# Patient Record
Sex: FEMALE | Race: WHITE | NOT HISPANIC OR LATINO | Employment: UNEMPLOYED | URBAN - METROPOLITAN AREA
[De-identification: names, ages, dates, MRNs, and addresses within clinical notes are randomized per-mention and may not be internally consistent; named-entity substitution may affect disease eponyms.]

---

## 2023-09-07 LAB
ABO GROUP (TYPE) IN BLOOD: NORMAL
ANTIBODY SCREEN: NORMAL
ERYTHROCYTE DISTRIBUTION WIDTH (RATIO) BY AUTOMATED COUNT: 12.7 % (ref 11.5–14.5)
ERYTHROCYTE MEAN CORPUSCULAR HEMOGLOBIN CONCENTRATION (G/DL) BY AUTOMATED: 33.3 G/DL (ref 32–36)
ERYTHROCYTE MEAN CORPUSCULAR VOLUME (FL) BY AUTOMATED COUNT: 91 FL (ref 80–100)
ERYTHROCYTES (10*6/UL) IN BLOOD BY AUTOMATED COUNT: 4.02 X10E12/L (ref 4–5.2)
HEMATOCRIT (%) IN BLOOD BY AUTOMATED COUNT: 36.6 % (ref 36–46)
HEMOGLOBIN (G/DL) IN BLOOD: 12.2 G/DL (ref 12–16)
HEPATITIS B VIRUS SURFACE AG PRESENCE IN SERUM: NONREACTIVE
HEPATITIS C VIRUS AB PRESENCE IN SERUM: NONREACTIVE
HIV 1/ 2 AG/AB SCREEN: NONREACTIVE
LEUKOCYTES (10*3/UL) IN BLOOD BY AUTOMATED COUNT: 8.1 X10E9/L (ref 4.4–11.3)
NRBC (PER 100 WBCS) BY AUTOMATED COUNT: 0 /100 WBC (ref 0–0)
PLATELETS (10*3/UL) IN BLOOD AUTOMATED COUNT: 231 X10E9/L (ref 150–450)
REFLEX ADDED, ANEMIA PANEL: NORMAL
RH FACTOR: NORMAL

## 2023-09-08 LAB
RUBELLA VIRUS IGG AB: POSITIVE
SYPHILIS TOTAL AB: NONREACTIVE

## 2023-09-29 LAB
CHLAMYDIA TRACH., AMPLIFIED: NEGATIVE
N. GONORRHEA, AMPLIFIED: NEGATIVE

## 2023-09-30 PROBLEM — R10.32 ABDOMINAL PAIN, LLQ (LEFT LOWER QUADRANT): Status: ACTIVE | Noted: 2023-09-30

## 2023-09-30 PROBLEM — R21 RASH/SKIN ERUPTION: Status: ACTIVE | Noted: 2023-09-30

## 2023-09-30 PROBLEM — K42.9 UMBILICAL HERNIA: Status: ACTIVE | Noted: 2023-09-30

## 2023-09-30 PROBLEM — O35.9XX0 SUSPECTED FETAL ANOMALY, ANTEPARTUM (HHS-HCC): Status: ACTIVE | Noted: 2023-09-30

## 2023-09-30 PROBLEM — Z04.9 CONDITION NOT FOUND: Status: ACTIVE | Noted: 2023-09-30

## 2023-09-30 PROBLEM — R10.2 FEMALE PELVIC PAIN: Status: ACTIVE | Noted: 2023-09-30

## 2023-09-30 PROBLEM — R10.33 UMBILICAL PAIN: Status: ACTIVE | Noted: 2023-09-30

## 2023-09-30 LAB — URINE CULTURE: NORMAL

## 2023-09-30 RX ORDER — ACETAMINOPHEN 325 MG/1
3 TABLET ORAL EVERY 6 HOURS
COMMUNITY
Start: 2020-12-19 | End: 2023-10-25 | Stop reason: ALTCHOICE

## 2023-09-30 RX ORDER — PRENATAL VIT 49/IRON FUM/FOLIC 6.75-0.2MG
TABLET ORAL
COMMUNITY
End: 2024-02-12 | Stop reason: HOSPADM

## 2023-09-30 RX ORDER — VITAMIN B COMPLEX
CAPSULE ORAL
COMMUNITY
End: 2024-02-12 | Stop reason: HOSPADM

## 2023-09-30 RX ORDER — IBUPROFEN 600 MG/1
600 TABLET ORAL EVERY 6 HOURS
COMMUNITY
Start: 2020-12-19 | End: 2023-10-25 | Stop reason: ALTCHOICE

## 2023-09-30 RX ORDER — FERROUS SULFATE 325(65) MG
TABLET ORAL
COMMUNITY
End: 2023-10-25 | Stop reason: ALTCHOICE

## 2023-10-03 ENCOUNTER — OFFICE VISIT (OUTPATIENT)
Dept: ORTHOPEDIC SURGERY | Facility: CLINIC | Age: 33
End: 2023-10-03
Payer: COMMERCIAL

## 2023-10-03 DIAGNOSIS — M25.562 CHRONIC PAIN OF LEFT KNEE: Primary | ICD-10-CM

## 2023-10-03 DIAGNOSIS — G89.29 CHRONIC PAIN OF LEFT KNEE: Primary | ICD-10-CM

## 2023-10-03 PROCEDURE — 99204 OFFICE O/P NEW MOD 45 MIN: CPT | Performed by: ORTHOPAEDIC SURGERY

## 2023-10-03 PROCEDURE — 1036F TOBACCO NON-USER: CPT | Performed by: ORTHOPAEDIC SURGERY

## 2023-10-03 RX ORDER — DICLOFENAC SODIUM 10 MG/G
4 GEL TOPICAL 4 TIMES DAILY
Qty: 1 G | Refills: 1 | Status: CANCELLED | OUTPATIENT
Start: 2023-10-03

## 2023-10-03 RX ORDER — DICLOFENAC SODIUM 10 MG/G
4 GEL TOPICAL 4 TIMES DAILY
Qty: 1 G | Refills: 1 | Status: SHIPPED | OUTPATIENT
Start: 2023-10-03 | End: 2023-10-25 | Stop reason: ALTCHOICE

## 2023-10-03 NOTE — PROGRESS NOTES
This is a new consultation for left knee pain from Candace Green DO      This is a 33 y.o. female who presents with left knee pain.  Patient states has been going on for 1 year.  Is a chronic issue but is been exacerbated for the last several weeks.  Sharp type pain over the medial aspect of the knee worse with walking.  Is associated with popping and catching.  She has never had injections or surgery on that knee.  No injury or change in activity recently but she did have an injury around the time that it started.  No numbness or tingling no fevers or chills no shooting pain, leg.  She takes Tylenol.  She is 16 weeks pregnant.    Physical Exam    Patients' self reported past medical history, medications, allergies, surgical history, family and social history as well as a 10 point  review of systems has been documented in the new patient intake form and scanned into the patient's electronic medical record.  The intake form was reviewed by Dr Davis during the initial office visit and signed by Dr. Davis and the patient. Pertinent findings are  documented in the HPI.    A complete 10 point review of systems was performed and was negative except as noted above in HPI.     Constitutional: well developed, well nourished female in no acute distress  Psychiatric: normal mood, appropriate affect  Eyes: sclera anicteric  HENT: normocephalic/atraumatic  CV: regular rate and rhythm   Respiratory: non labored breathing  Integumentary: no rash  Neurological: moves all extremities    Left knee exam: skin intact no lacerations or abrations.  1+ effusion.  Tender medial joint line. negative log roll negative patellar grind. ROM 0-120. stable to varus and valgus stress at 0 and 30 degrees. negative lachman negative posterior drawer negative gt. 5/5 ehl/fhl/gs/ta. silt s/s/sp/dp/t. 2+ dp/pt        No x-rays done today because of the patient's pregnancy    Impression/Plan: This is a 33 y.o. female with left knee  pain and mechanical symptoms consistent with a possible meniscal tear.  I discussed the situation with her in detail including the clinical radiographic findings treatment options were sent benefits.  We discussed nonsurgical treatment for now given the patient's current pregnancy.  We discussed the use of anti-inflammatories in the setting of pregnancy and recommend Tylenol and a topical anti-inflammatory.  Recommend she discuss with her OB/GYN regarding other potential medications during this time.  We will start her on physical therapy.  Could also consider injection patient deferred for today.  We will plan to defer advanced imaging or consideration of surgery until after the pregnancy.  We will see her back as needed

## 2023-10-03 NOTE — LETTER
October 3, 2023     Candace Green DO  [address]  [facility]  [address]  [facility] [address]    Patient: Christine Navarro   YOB: 1990   Date of Visit: 10/3/2023       Dear Dr. Candace Green DO:    Thank you for referring Christine Navarro to me for evaluation. Below are my notes for this consultation.  If you have questions, please do not hesitate to call me. I look forward to following your patient along with you.       Sincerely,     Rosalino Davis MD      CC: No Recipients  ______________________________________________________________________________________    This is a new consultation for left knee pain from Candace Green DO      This is a 33 y.o. female who presents with left knee pain.  Patient states has been going on for 1 year.  Is a chronic issue but is been exacerbated for the last several weeks.  Sharp type pain over the medial aspect of the knee worse with walking.  Is associated with popping and catching.  She has never had injections or surgery on that knee.  No injury or change in activity recently but she did have an injury around the time that it started.  No numbness or tingling no fevers or chills no shooting pain, leg.  She takes Tylenol.  She is 16 weeks pregnant.    Physical Exam    Patients' self reported past medical history, medications, allergies, surgical history, family and social history as well as a 10 point  review of systems has been documented in the new patient intake form and scanned into the patient's electronic medical record.  The intake form was reviewed by Dr Davis during the initial office visit and signed by Dr. Davis and the patient. Pertinent findings are  documented in the HPI.    A complete 10 point review of systems was performed and was negative except as noted above in HPI.     Constitutional: well developed, well nourished female in no acute distress  Psychiatric:  normal mood, appropriate affect  Eyes: sclera anicteric  HENT: normocephalic/atraumatic  CV: regular rate and rhythm   Respiratory: non labored breathing  Integumentary: no rash  Neurological: moves all extremities    Left knee exam: skin intact no lacerations or abrations.  1+ effusion.  Tender medial joint line. negative log roll negative patellar grind. ROM 0-120. stable to varus and valgus stress at 0 and 30 degrees. negative lachman negative posterior drawer negative gt. 5/5 ehl/fhl/gs/ta. silt s/s/sp/dp/t. 2+ dp/pt        No x-rays done today because of the patient's pregnancy    Impression/Plan: This is a 33 y.o. female with left knee pain and mechanical symptoms consistent with a possible meniscal tear.  I discussed the situation with her in detail including the clinical radiographic findings treatment options were sent benefits.  We discussed nonsurgical treatment for now given the patient's current pregnancy.  We discussed the use of anti-inflammatories in the setting of pregnancy and recommend Tylenol and a topical anti-inflammatory.  Recommend she discuss with her OB/GYN regarding other potential medications during this time.  We will start her on physical therapy.  Could also consider injection patient deferred for today.  We will plan to defer advanced imaging or consideration of surgery until after the pregnancy.  We will see her back as needed

## 2023-10-04 VITALS — SYSTOLIC BLOOD PRESSURE: 130 MMHG | BODY MASS INDEX: 31.96 KG/M2 | DIASTOLIC BLOOD PRESSURE: 80 MMHG | WEIGHT: 198 LBS

## 2023-10-04 NOTE — PROGRESS NOTES
ENOB.  Feeling well today. Less nausea, more energy.  No bleeding or abdominal pain    Limited abdominal ultrasound done to find FHR: normal heart rate, active fetal movements.    Routine prenatal care.   Prenatal labs done and normal.  Urine, gc/ct testing (also from urine - patient declined pelvic exam)  Declines genetic screening.  Declines Flu and Covid vaccinations.  Order given for her anatomy ultrasound. -Renee Hill 09/28/2023 02:04 PM

## 2023-10-19 ENCOUNTER — APPOINTMENT (OUTPATIENT)
Dept: RADIOLOGY | Facility: CLINIC | Age: 33
End: 2023-10-19
Payer: COMMERCIAL

## 2023-10-23 ENCOUNTER — ANCILLARY PROCEDURE (OUTPATIENT)
Dept: RADIOLOGY | Facility: CLINIC | Age: 33
End: 2023-10-23
Payer: COMMERCIAL

## 2023-10-23 DIAGNOSIS — O09.529 AMA (ADVANCED MATERNAL AGE) MULTIGRAVIDA 35+ (HHS-HCC): ICD-10-CM

## 2023-10-23 DIAGNOSIS — Z3A.20 20 WEEKS GESTATION OF PREGNANCY (HHS-HCC): ICD-10-CM

## 2023-10-23 PROCEDURE — 76811 OB US DETAILED SNGL FETUS: CPT | Performed by: OBSTETRICS & GYNECOLOGY

## 2023-10-23 PROCEDURE — 76811 OB US DETAILED SNGL FETUS: CPT

## 2023-10-24 RX ORDER — FAMOTIDINE 40 MG/1
40 TABLET, FILM COATED ORAL DAILY
COMMUNITY
Start: 2023-04-24 | End: 2023-10-25 | Stop reason: ALTCHOICE

## 2023-10-24 RX ORDER — EPINEPHRINE 0.3 MG/.3ML
0.3 INJECTION SUBCUTANEOUS ONCE AS NEEDED
COMMUNITY
Start: 2023-04-24 | End: 2023-12-15 | Stop reason: WASHOUT

## 2023-10-24 NOTE — PATIENT INSTRUCTIONS
You were seen in the office today for routine OB care and had normal findings on exam  Continue routine OB precautions at home  Continue taking prenatal vitamins   Avoid sick contacts and consider getting your Flu (available in office during flu season) and COVID vaccines to protect against infection in pregnancy  You will be given an order for your anatomy ultrasound. Please schedule at [facility] OB imaging between 19 and 22 weeks gestation [phone]  You will be given a bottle of Glucola and orders for your second trimester labs. Please complete these between 25 and 28 weeks gestation. You may complete these at any  outpatient lab, and do not need an appointment  Make an appointment for routine care in the office in the next 4 weeks  If you are having any concerns prior to your next visit please call the office to speak to the physician on call. This includes after hours, weekends, and holidays, when the answering service will be able to connect you with the physician on call. [phone] ([place] [facility]) or [phone] [facility].

## 2023-10-25 ENCOUNTER — ROUTINE PRENATAL (OUTPATIENT)
Dept: OBSTETRICS AND GYNECOLOGY | Facility: CLINIC | Age: 33
End: 2023-10-25
Payer: COMMERCIAL

## 2023-10-25 VITALS — BODY MASS INDEX: 32.6 KG/M2 | SYSTOLIC BLOOD PRESSURE: 114 MMHG | DIASTOLIC BLOOD PRESSURE: 72 MMHG | WEIGHT: 202 LBS

## 2023-10-25 DIAGNOSIS — Z3A.19 19 WEEKS GESTATION OF PREGNANCY (HHS-HCC): ICD-10-CM

## 2023-10-25 DIAGNOSIS — Z34.82 PRENATAL CARE, SUBSEQUENT PREGNANCY, SECOND TRIMESTER (HHS-HCC): Primary | ICD-10-CM

## 2023-10-25 DIAGNOSIS — Z98.891 HISTORY OF CESAREAN DELIVERY: ICD-10-CM

## 2023-10-25 DIAGNOSIS — Z87.59 HISTORY OF PRE-ECLAMPSIA: ICD-10-CM

## 2023-10-25 LAB
POC GLUCOSE, URINE: NEGATIVE MG/DL
POC PROTEIN, URINE: NEGATIVE MG/DL

## 2023-10-25 PROCEDURE — 99213 OFFICE O/P EST LOW 20 MIN: CPT | Performed by: OBSTETRICS & GYNECOLOGY

## 2023-10-25 RX ORDER — CALCIUM CARBONATE 500(1250)
1 TABLET ORAL
COMMUNITY
End: 2024-02-12 | Stop reason: HOSPADM

## 2023-10-25 NOTE — PROGRESS NOTES
Subjective   Patient ID [id number]   Christine Navarro is a 33 y.o.  at 19w3d with a working estimated date of delivery of 3/17/2024, by Last Menstrual Period who presents for a routine prenatal visit. She denies vaginal bleeding, leakage of fluid, decreased fetal movements, or contractions.    Her pregnancy is complicated by:  AMA  Hx preeclampsia/GHTN  LTCS    Objective   Physical Exam  Weight: 91.6 kg (202 lb)  Expected Total Weight Gain: 5 kg (11 lb)-9 kg (19 lb)   Pregravid BMI: 31.65  BP: 114/72         Prenatal Labs  Urine dip:  Lab Results   Component Value Date    KETONESU NEGATIVE 2019       Lab Results   Component Value Date    HGB 12.2 2023    HCT 36.6 2023    ABO A 2023    HEPBSAG NONREACTIVE 2023           Imaging: anatomy US normal earlier this week, anterior placenta, no previa, 3VC          Assessment/Plan   Problem List Items Addressed This Visit    None  Visit Diagnoses         Codes    Prenatal care, subsequent pregnancy, second trimester    -  Primary Z34.82    Patient doing well  Anatomy US normal earlier this week  RTO 4 weeks     History of pre-eclampsia     Z87.59    BP normal range, patient asymptomatic  Continue  mg/day     History of  delivery     Z98.891    Patient desires TOLAC     19 weeks gestation of pregnancy     Z3A.19    Relevant Orders    POC Urine Dip (Completed)            Continue prenatal vitamin.  Labs reviewed.  Rhogam not indicated  GTT 25-28.    Follow up in 4 weeks for a routine prenatal visit.  Viridiana Morrison DO

## 2023-11-17 NOTE — PROGRESS NOTES
Subjective   Patient ID [id number]   Christine Navarro is a 33 y.o.  at 23.1 weeks, +FM. Patient hasn't started the ASA yet. Concerned about risks.    Her pregnancy is complicated by:  AMA  Hx preeclampsia/GHTN  LTCS    Objective   Physical Exam  Weight: 93.4 kg (206 lb)  BP: 122/70  Fetal Heart Rate: 140 Fundal Height (cm): 27 cm    Lab Results   Component Value Date    HGB 12.2 2023    HCT 36.6 2023       Assessment/Plan   Recommend aspirin  mg. Discussed that it is very safe in pregnancy and has been used for years. She is at high risk for Preeclampsia and it is recommended. She is late now but would still begin it.  Growth scan at 30 weeks.  Glucola given.  Follow up in 4 weeks for a routine prenatal visit.

## 2023-11-20 ENCOUNTER — ROUTINE PRENATAL (OUTPATIENT)
Dept: OBSTETRICS AND GYNECOLOGY | Facility: CLINIC | Age: 33
End: 2023-11-20
Payer: COMMERCIAL

## 2023-11-20 VITALS — SYSTOLIC BLOOD PRESSURE: 122 MMHG | WEIGHT: 206 LBS | DIASTOLIC BLOOD PRESSURE: 70 MMHG | BODY MASS INDEX: 33.25 KG/M2

## 2023-11-20 DIAGNOSIS — Z3A.23 23 WEEKS GESTATION OF PREGNANCY (HHS-HCC): ICD-10-CM

## 2023-11-20 LAB
POC BLOOD, URINE: NEGATIVE
POC GLUCOSE, URINE: NEGATIVE MG/DL
POC KETONES, URINE: NEGATIVE MG/DL
POC LEUKOCYTES, URINE: NEGATIVE
POC NITRITE,URINE: NEGATIVE
POC PROTEIN, URINE: NEGATIVE MG/DL

## 2023-11-20 PROCEDURE — 0501F PRENATAL FLOW SHEET: CPT | Performed by: OBSTETRICS & GYNECOLOGY

## 2023-12-13 NOTE — PROGRESS NOTES
ytSubjective   Patient ID [id number]   Christine Navarro is a 33 y.o.  at 26w5d 2,+FM, no ctxs/LOF/VB.    Her pregnancy is complicated by:  AMA  Hx preeclampsia/GHTN  LTCS    Objective   Physical Exam  Weight: 97.5 kg (215 lb)  BP: 118/72  Fetal Heart Rate: 140 Fundal Height (cm): 27 cm    Lab Results   Component Value Date    HGB 12.2 2023    HCT 36.6 2023       Assessment/Plan   Has a growth scan in January.  Did mary today.  Follow up in 4 weeks for a routine prenatal visit.

## 2023-12-15 ENCOUNTER — LAB (OUTPATIENT)
Dept: LAB | Facility: LAB | Age: 33
End: 2023-12-15
Payer: COMMERCIAL

## 2023-12-15 ENCOUNTER — ROUTINE PRENATAL (OUTPATIENT)
Dept: OBSTETRICS AND GYNECOLOGY | Facility: CLINIC | Age: 33
End: 2023-12-15
Payer: COMMERCIAL

## 2023-12-15 VITALS — DIASTOLIC BLOOD PRESSURE: 72 MMHG | WEIGHT: 215 LBS | SYSTOLIC BLOOD PRESSURE: 118 MMHG | BODY MASS INDEX: 34.7 KG/M2

## 2023-12-15 DIAGNOSIS — Z00.00 HEALTHCARE MAINTENANCE: Primary | ICD-10-CM

## 2023-12-15 DIAGNOSIS — Z3A.23 23 WEEKS GESTATION OF PREGNANCY (HHS-HCC): ICD-10-CM

## 2023-12-15 DIAGNOSIS — Z3A.26 26 WEEKS GESTATION OF PREGNANCY (HHS-HCC): ICD-10-CM

## 2023-12-15 DIAGNOSIS — Z32.02 PREGNANCY TEST NEGATIVE: ICD-10-CM

## 2023-12-15 DIAGNOSIS — Z00.00 HEALTHCARE MAINTENANCE: ICD-10-CM

## 2023-12-15 LAB
25(OH)D3 SERPL-MCNC: 16 NG/ML (ref 30–100)
ERYTHROCYTE [DISTWIDTH] IN BLOOD BY AUTOMATED COUNT: 12.6 % (ref 11.5–14.5)
GLUCOSE 1H P 50 G GLC PO SERPL-MCNC: 83 MG/DL
HCT VFR BLD AUTO: 33.7 % (ref 36–46)
HGB BLD-MCNC: 11.4 G/DL (ref 12–16)
MCH RBC QN AUTO: 31.2 PG (ref 26–34)
MCHC RBC AUTO-ENTMCNC: 33.8 G/DL (ref 32–36)
MCV RBC AUTO: 92 FL (ref 80–100)
NRBC BLD-RTO: 0 /100 WBCS (ref 0–0)
PLATELET # BLD AUTO: 213 X10*3/UL (ref 150–450)
POC BLOOD, URINE: NEGATIVE
POC GLUCOSE, URINE: NEGATIVE MG/DL
POC KETONES, URINE: NEGATIVE MG/DL
POC LEUKOCYTES, URINE: NEGATIVE
POC NITRITE,URINE: NEGATIVE
POC PROTEIN, URINE: NEGATIVE MG/DL
PREGNANCY TEST URINE, POC: NEGATIVE
RBC # BLD AUTO: 3.65 X10*6/UL (ref 4–5.2)
WBC # BLD AUTO: 8.1 X10*3/UL (ref 4.4–11.3)

## 2023-12-15 PROCEDURE — 81025 URINE PREGNANCY TEST: CPT | Performed by: OBSTETRICS & GYNECOLOGY

## 2023-12-15 PROCEDURE — 36415 COLL VENOUS BLD VENIPUNCTURE: CPT

## 2023-12-15 PROCEDURE — 99213 OFFICE O/P EST LOW 20 MIN: CPT | Performed by: OBSTETRICS & GYNECOLOGY

## 2023-12-15 PROCEDURE — 82947 ASSAY GLUCOSE BLOOD QUANT: CPT

## 2023-12-15 PROCEDURE — 85027 COMPLETE CBC AUTOMATED: CPT

## 2023-12-15 PROCEDURE — 82306 VITAMIN D 25 HYDROXY: CPT

## 2023-12-15 PROCEDURE — 81003 URINALYSIS AUTO W/O SCOPE: CPT | Performed by: OBSTETRICS & GYNECOLOGY

## 2023-12-15 NOTE — PATIENT INSTRUCTIONS
You were seen in the office today for routine OB care and had noirmal findings on exam  Continue routine OB precautions at home  Continue taking prenatal vitamins   Avoid sick contacts and consider getting your Flu (available in office during flu season) and COVID vaccines to protect against infection in pregnancy  You will be given an order for your anatomy ultrasound. Please schedule at [facility] OB imaging between 19 and 22 weeks gestation [phone]  You will be given a bottle of Glucola and orders for your second trimester labs. Please complete these between 25 and 28 weeks gestation. You may complete these at any  outpatient lab, and do not need an appointment  Make an appointment for routine care in the office in the next 4 weeks  If you are having any concerns prior to your next visit please call the office to speak to the physician on call. This includes after hours, weekends, and holidays, when the answering service will be able to connect you with the physician on call. [phone] ([place] [facility]) or [phone] [place] [facility].

## 2023-12-18 ENCOUNTER — APPOINTMENT (OUTPATIENT)
Dept: OBSTETRICS AND GYNECOLOGY | Facility: CLINIC | Age: 33
End: 2023-12-18
Payer: COMMERCIAL

## 2023-12-18 LAB — REFLEX ADDED, ANEMIA PANEL: NORMAL

## 2023-12-22 ENCOUNTER — APPOINTMENT (OUTPATIENT)
Dept: OBSTETRICS AND GYNECOLOGY | Facility: CLINIC | Age: 33
End: 2023-12-22
Payer: COMMERCIAL

## 2024-01-09 ENCOUNTER — APPOINTMENT (OUTPATIENT)
Dept: RADIOLOGY | Facility: CLINIC | Age: 34
End: 2024-01-09
Payer: COMMERCIAL

## 2024-01-11 ENCOUNTER — ANCILLARY PROCEDURE (OUTPATIENT)
Dept: RADIOLOGY | Facility: CLINIC | Age: 34
End: 2024-01-11
Payer: COMMERCIAL

## 2024-01-11 DIAGNOSIS — Z3A.30 30 WEEKS GESTATION OF PREGNANCY (HHS-HCC): ICD-10-CM

## 2024-01-11 PROCEDURE — 76816 OB US FOLLOW-UP PER FETUS: CPT | Performed by: OBSTETRICS & GYNECOLOGY

## 2024-01-11 PROCEDURE — 76816 OB US FOLLOW-UP PER FETUS: CPT

## 2024-01-11 PROCEDURE — 76819 FETAL BIOPHYS PROFIL W/O NST: CPT

## 2024-01-11 PROCEDURE — 76819 FETAL BIOPHYS PROFIL W/O NST: CPT | Performed by: OBSTETRICS & GYNECOLOGY

## 2024-01-15 ENCOUNTER — ROUTINE PRENATAL (OUTPATIENT)
Dept: OBSTETRICS AND GYNECOLOGY | Facility: CLINIC | Age: 34
End: 2024-01-15
Payer: COMMERCIAL

## 2024-01-15 ENCOUNTER — LAB (OUTPATIENT)
Dept: LAB | Facility: LAB | Age: 34
End: 2024-01-15
Payer: COMMERCIAL

## 2024-01-15 VITALS — BODY MASS INDEX: 35.02 KG/M2 | SYSTOLIC BLOOD PRESSURE: 142 MMHG | WEIGHT: 217 LBS | DIASTOLIC BLOOD PRESSURE: 88 MMHG

## 2024-01-15 DIAGNOSIS — Z34.83 MULTIGRAVIDA IN THIRD TRIMESTER (HHS-HCC): Primary | ICD-10-CM

## 2024-01-15 DIAGNOSIS — R03.0 ELEVATED BP WITHOUT DIAGNOSIS OF HYPERTENSION: ICD-10-CM

## 2024-01-15 DIAGNOSIS — Z3A.31 31 WEEKS GESTATION OF PREGNANCY (HHS-HCC): ICD-10-CM

## 2024-01-15 LAB
ALT SERPL W P-5'-P-CCNC: 7 U/L (ref 7–45)
AST SERPL W P-5'-P-CCNC: 12 U/L (ref 9–39)
CREAT SERPL-MCNC: 0.62 MG/DL (ref 0.5–1.05)
EGFRCR SERPLBLD CKD-EPI 2021: >90 ML/MIN/1.73M*2
ERYTHROCYTE [DISTWIDTH] IN BLOOD BY AUTOMATED COUNT: 12.9 % (ref 11.5–14.5)
HCT VFR BLD AUTO: 36.9 % (ref 36–46)
HGB BLD-MCNC: 12.4 G/DL (ref 12–16)
LDH SERPL L TO P-CCNC: 138 U/L (ref 84–246)
MCH RBC QN AUTO: 31.1 PG (ref 26–34)
MCHC RBC AUTO-ENTMCNC: 33.6 G/DL (ref 32–36)
MCV RBC AUTO: 93 FL (ref 80–100)
NRBC BLD-RTO: 0 /100 WBCS (ref 0–0)
PLATELET # BLD AUTO: 198 X10*3/UL (ref 150–450)
POC GLUCOSE, URINE: NEGATIVE MG/DL
POC PROTEIN, URINE: ABNORMAL MG/DL
RBC # BLD AUTO: 3.99 X10*6/UL (ref 4–5.2)
REFLEX ADDED, ANEMIA PANEL: NORMAL
URATE SERPL-MCNC: 4.3 MG/DL (ref 2.3–6.7)
WBC # BLD AUTO: 11 X10*3/UL (ref 4.4–11.3)

## 2024-01-15 PROCEDURE — 81003 URINALYSIS AUTO W/O SCOPE: CPT | Performed by: OBSTETRICS & GYNECOLOGY

## 2024-01-15 PROCEDURE — 83615 LACTATE (LD) (LDH) ENZYME: CPT

## 2024-01-15 PROCEDURE — 82565 ASSAY OF CREATININE: CPT

## 2024-01-15 PROCEDURE — 85027 COMPLETE CBC AUTOMATED: CPT

## 2024-01-15 PROCEDURE — 84450 TRANSFERASE (AST) (SGOT): CPT

## 2024-01-15 PROCEDURE — 36415 COLL VENOUS BLD VENIPUNCTURE: CPT

## 2024-01-15 PROCEDURE — 84156 ASSAY OF PROTEIN URINE: CPT

## 2024-01-15 PROCEDURE — 82570 ASSAY OF URINE CREATININE: CPT

## 2024-01-15 PROCEDURE — 0501F PRENATAL FLOW SHEET: CPT | Performed by: OBSTETRICS & GYNECOLOGY

## 2024-01-15 PROCEDURE — 84460 ALANINE AMINO (ALT) (SGPT): CPT

## 2024-01-15 PROCEDURE — 84550 ASSAY OF BLOOD/URIC ACID: CPT

## 2024-01-15 NOTE — PROGRESS NOTES
Subjective   Patient ID [id number]   Christine Navarro is a 33 y.o.  at 31w1d with a working estimated date of delivery of 3/17/2024, by Last Menstrual Period who presents for a routine prenatal visit. She denies vaginal bleeding, leakage of fluid, decreased fetal movements, or contractions.    Her pregnancy is complicated by:  Hx GHTN  AMA  Hx LTCS  LGA fetus    Objective   Physical Exam:   Weight: 98.4 kg (217 lb)  Expected Total Weight Gain: 5 kg (11 lb)-9 kg (19 lb)   Pregravid BMI: 31.65  BP: 140/84                  Prenatal Labs  Urine Dip:  Lab Results   Component Value Date    KETONESU NEGATIVE 12/15/2023     Lab Results   Component Value Date    HGB 11.4 (L) 12/15/2023    HCT 33.7 (L) 12/15/2023    ABO A 2023    HEPBSAG NONREACTIVE 2023           Imagin g (98%) increased interval growth with AC 99%    Assessment/Plan   Problem List Items Addressed This Visit    None  Visit Diagnoses         Codes    Multigravida in third trimester    -  Primary Z34.83    Patient overall doing well  Reviewed recent US results  Precautions given  RTO 3-4 days     31 weeks gestation of pregnancy     Z3A.31    Relevant Orders    POCT UA Automated manually resulted (Completed)    Elevated BP without diagnosis of hypertension     R03.0    Patient with mild range BP in office3 1/15 at 31 weeks  Sustained on repeat  HELLP labs/UPCr ordered  RTO 3-4 days for repeat BP           Continue prenatal vitamin.  Labs reviewed.  GBS 36 weeks.  Expected mode of delivery FLAKITA Morrison DO

## 2024-01-15 NOTE — PATIENT INSTRUCTIONS
You were seen in the office today for routine OB care and had normal findings on exam  Continue routine OB precautions at home  Continue taking prenatal vitamins   Avoid sick contacts and consider getting your Flu (available in office during flu season) and COVID vaccines to protect against infection in pregnancy  We recommend getting the Tdap (available in office) and RSV vaccines to pass some immunity from mother to baby and protect your baby against RSV and Whooping cough in the first few months of life. Tdap can be given between 27 and 36 weeks, and RSV vaccinations can be given between 32 and 36 weeks gestation  Make an appointment for routine care in the office in the next 2 weeks  If you are having any painful contractions, vaginal bleeding, leaking amniotic fluid, or decrease in baby's movements prior to your next visit please call the office to speak to the physician on call. This includes after hours, weekends, and holidays, when the answering service will be able to connect you with the physician on call. [phone] ([place] [facility]) or [phone] ([place] [facility]).

## 2024-01-16 LAB
CREAT UR-MCNC: 146.9 MG/DL
PROT UR-MCNC: 16 MG/DL
PROT/CREAT UR: 0.11 MG/MG CREAT

## 2024-01-17 ENCOUNTER — ROUTINE PRENATAL (OUTPATIENT)
Dept: OBSTETRICS AND GYNECOLOGY | Facility: CLINIC | Age: 34
End: 2024-01-17
Payer: COMMERCIAL

## 2024-01-17 VITALS — BODY MASS INDEX: 34.86 KG/M2 | WEIGHT: 216 LBS | DIASTOLIC BLOOD PRESSURE: 90 MMHG | SYSTOLIC BLOOD PRESSURE: 138 MMHG

## 2024-01-17 DIAGNOSIS — Z3A.31 31 WEEKS GESTATION OF PREGNANCY (HHS-HCC): ICD-10-CM

## 2024-01-17 DIAGNOSIS — O13.3 GESTATIONAL HYPERTENSION, THIRD TRIMESTER (HHS-HCC): Primary | ICD-10-CM

## 2024-01-17 LAB
POC GLUCOSE, URINE: NEGATIVE MG/DL
POC PROTEIN, URINE: ABNORMAL MG/DL

## 2024-01-17 PROCEDURE — 81003 URINALYSIS AUTO W/O SCOPE: CPT | Performed by: OBSTETRICS & GYNECOLOGY

## 2024-01-17 PROCEDURE — 0501F PRENATAL FLOW SHEET: CPT | Performed by: OBSTETRICS & GYNECOLOGY

## 2024-01-17 NOTE — PATIENT INSTRUCTIONS
You were seen in the office today for routine OB care and ruled in for gestational hypertension  Continue routine OB precautions at home  Continue taking prenatal vitamins   Continue BP checks at home in the morning and evening. Call for any BP higher than 160/110, severe/persistent headaches, changes in vision, chest pain, persistent/worsening shortness of breath.  You will be seen weekly for nonstress testing, labs, and BP checks  Avoid sick contacts and consider getting your Flu (available in office during flu season) and COVID vaccines to protect against infection in pregnancy  We recommend getting the Tdap (available in office) and RSV vaccines to pass some immunity from mother to baby and protect your baby against RSV and Whooping cough in the first few months of life. Tdap can be given between 27 and 36 weeks, and RSV vaccinations can be given between 32 and 36 weeks gestation  Make an appointment for routine care in the office in the next 2 weeks  If you are having any painful contractions, vaginal bleeding, leaking amniotic fluid, or decrease in baby's movements prior to your next visit please call the office to speak to the physician on call. This includes after hours, weekends, and holidays, when the answering service will be able to connect you with the physician on call. [phone] ([place] Office) or [phone] ([place] [facility]).

## 2024-01-17 NOTE — PROGRESS NOTES
Subjective   Patient ID [id number]   Christine Navarro is a 33 y.o.  at 31w3d with a working estimated date of delivery of 3/17/2024, by Last Menstrual Period who presents for a routine prenatal visit. She denies vaginal bleeding, leakage of fluid, decreased fetal movements, or contractions.    Her pregnancy is complicated by:  Elevated BP  Hx GHTN  AMA  Hx LTCS  LGA      Objective   Physical Exam:   Weight: 98 kg (216 lb)  Expected Total Weight Gain: 5 kg (11 lb)-9 kg (19 lb)   Pregravid BMI: 31.65  BP: 138/90                  Prenatal Labs  Urine Dip:  Lab Results   Component Value Date    KETONESU NEGATIVE 12/15/2023     Lab Results   Component Value Date    HGB 12.4 01/15/2024    HCT 36.9 01/15/2024    ABO A 2023    HEPBSAG NONREACTIVE 2023           Imaging: ordered    Assessment/Plan   Problem List Items Addressed This Visit    None  Visit Diagnoses         Codes    Gestational hypertension, third trimester    -  Primary O13.3    Ruled in , asymptomatic  HELLP labs/UPCr wnl  Weekly visit/NST  Growth US ordered  Delivery 37 weeks     Relevant Orders    US MAC OB imaging order          Continue prenatal vitamin.  Labs reviewed.  GBS 36  Expected mode of delivery TOLAC  Follow up in 1 week for a routine prenatal visit/NST  Viridiana Morrison DO

## 2024-01-24 NOTE — PROGRESS NOTES
Subjective   Patient ID [id number]   Christine Navarro is a 33 y.o.  at 32w4d, +FM.    Bps at home 130-136/80-97. Normal in the morning. H/A last night, gone now. Feels good now.    Her pregnancy is complicated by:  GHTN confirmed this pregnancy  Hx GHTN  AMA  Hx LTCS  LGA    Objective   Physical Exam  Weight: 97.5 kg (215 lb)  BP: 124/86  Fetal Heart Rate: 130 Fundal Height (cm): 38 cm    NST reactive.    Lab Results   Component Value Date    HGB 12.4 01/15/2024    HCT 36.9 01/15/2024       Assessment/Plan   Twice weekly NSTs for diagnosis of GHTN. Repeat labs today. Will continue to monitor BP at home.  M ultrasound next week. Measuring large for dates.

## 2024-01-25 ENCOUNTER — PROCEDURE VISIT (OUTPATIENT)
Dept: OBSTETRICS AND GYNECOLOGY | Facility: CLINIC | Age: 34
End: 2024-01-25
Payer: COMMERCIAL

## 2024-01-25 ENCOUNTER — LAB (OUTPATIENT)
Dept: LAB | Facility: LAB | Age: 34
End: 2024-01-25
Payer: COMMERCIAL

## 2024-01-25 VITALS — BODY MASS INDEX: 34.7 KG/M2 | DIASTOLIC BLOOD PRESSURE: 86 MMHG | SYSTOLIC BLOOD PRESSURE: 124 MMHG | WEIGHT: 215 LBS

## 2024-01-25 DIAGNOSIS — O13.3 GESTATIONAL HYPERTENSION, THIRD TRIMESTER (HHS-HCC): ICD-10-CM

## 2024-01-25 DIAGNOSIS — Z34.83 PRENATAL CARE, SUBSEQUENT PREGNANCY, THIRD TRIMESTER (HHS-HCC): ICD-10-CM

## 2024-01-25 DIAGNOSIS — Z3A.32 32 WEEKS GESTATION OF PREGNANCY (HHS-HCC): ICD-10-CM

## 2024-01-25 LAB
ALT SERPL W P-5'-P-CCNC: 12 U/L (ref 7–45)
AST SERPL W P-5'-P-CCNC: 18 U/L (ref 9–39)
CREAT SERPL-MCNC: 0.44 MG/DL (ref 0.5–1.05)
CREAT UR-MCNC: 10.8 MG/DL
EGFRCR SERPLBLD CKD-EPI 2021: >90 ML/MIN/1.73M*2
ERYTHROCYTE [DISTWIDTH] IN BLOOD BY AUTOMATED COUNT: 13.3 % (ref 11.5–14.5)
HCT VFR BLD AUTO: 38.9 % (ref 36–46)
HGB BLD-MCNC: 13 G/DL (ref 12–16)
LDH SERPL L TO P-CCNC: 153 U/L (ref 84–246)
MCH RBC QN AUTO: 32.3 PG (ref 26–34)
MCHC RBC AUTO-ENTMCNC: 33.4 G/DL (ref 32–36)
MCV RBC AUTO: 97 FL (ref 80–100)
NRBC BLD-RTO: 0 /100 WBCS (ref 0–0)
PLATELET # BLD AUTO: 211 X10*3/UL (ref 150–450)
POC BLOOD, URINE: NEGATIVE
POC GLUCOSE, URINE: NEGATIVE MG/DL
POC KETONES, URINE: NEGATIVE MG/DL
POC LEUKOCYTES, URINE: ABNORMAL
POC NITRITE,URINE: NEGATIVE
POC PROTEIN, URINE: NEGATIVE MG/DL
PROT UR-ACNC: <4 MG/DL
PROT/CREAT UR: NORMAL MG/G{CREAT}
RBC # BLD AUTO: 4.03 X10*6/UL (ref 4–5.2)
URATE SERPL-MCNC: 4.4 MG/DL (ref 2.3–6.7)
WBC # BLD AUTO: 6.9 X10*3/UL (ref 4.4–11.3)

## 2024-01-25 PROCEDURE — 82565 ASSAY OF CREATININE: CPT

## 2024-01-25 PROCEDURE — 83615 LACTATE (LD) (LDH) ENZYME: CPT

## 2024-01-25 PROCEDURE — 36415 COLL VENOUS BLD VENIPUNCTURE: CPT

## 2024-01-25 PROCEDURE — 84550 ASSAY OF BLOOD/URIC ACID: CPT

## 2024-01-25 PROCEDURE — 84156 ASSAY OF PROTEIN URINE: CPT

## 2024-01-25 PROCEDURE — 85027 COMPLETE CBC AUTOMATED: CPT

## 2024-01-25 PROCEDURE — 99213 OFFICE O/P EST LOW 20 MIN: CPT | Performed by: OBSTETRICS & GYNECOLOGY

## 2024-01-25 PROCEDURE — 81003 URINALYSIS AUTO W/O SCOPE: CPT | Performed by: OBSTETRICS & GYNECOLOGY

## 2024-01-25 PROCEDURE — 84460 ALANINE AMINO (ALT) (SGPT): CPT

## 2024-01-25 PROCEDURE — 59025 FETAL NON-STRESS TEST: CPT | Performed by: OBSTETRICS & GYNECOLOGY

## 2024-01-25 PROCEDURE — 82570 ASSAY OF URINE CREATININE: CPT

## 2024-01-25 PROCEDURE — 84450 TRANSFERASE (AST) (SGOT): CPT

## 2024-01-25 NOTE — PATIENT INSTRUCTIONS
You were seen in the office today for routine OB care  Continue routine OB precautions at home  Continue taking prenatal vitamins   Avoid sick contacts and consider getting your Flu (available in office during flu season) and COVID vaccines to protect against infection in pregnancy  We recommend getting the Tdap (available in office) and RSV vaccines to pass some immunity from mother to baby and protect your baby against RSV and Whooping cough in the first few months of life. Tdap can be given between 27 and 36 weeks, and RSV vaccinations can be given between 32 and 36 weeks gestation  Make an appointment for routine care in the office in the next 2 weeks  If you are having any painful contractions, vaginal bleeding, leaking amniotic fluid, or decrease in baby's movements prior to your next visit please call the office to speak to the physician on call. This includes after hours, weekends, and holidays, when the answering service will be able to connect you with the physician on call. [phone] ([place] Office) or [phone] ([place] [facility]).   Ultrasound next week  Twice weekly testing.

## 2024-01-30 NOTE — PROGRESS NOTES
Subjective   Patient ID [id number]   Christine Navarro is a 33 y.o.  at 34w1d, +FM, mild contractions. Bps higher in evenings at home, highest 138/98. No H/A, visual changes, RUQ pain.    Her pregnancy is complicated by:  GHTN confirmed this pregnancy  Hx GHTN  AMA  Hx LTCS  LGA          Objective   Physical Exam  Weight: 98.9 kg (218 lb)  BP: 128/80  Fetal Heart Rate: 130 Fundal Height (cm): 36 cm  NST reactive.    Lab Results   Component Value Date    HGB 12.4 2024    HCT 36.0 2024       Assessment/Plan   Ultrasound next week.  Follow up in 1/2 weeks for a routine prenatal visit. Labs and NST.

## 2024-01-31 ENCOUNTER — HOSPITAL ENCOUNTER (OUTPATIENT)
Dept: RADIOLOGY | Facility: CLINIC | Age: 34
Discharge: HOME | End: 2024-01-31
Payer: COMMERCIAL

## 2024-01-31 DIAGNOSIS — O13.3 GESTATIONAL HYPERTENSION, THIRD TRIMESTER (HHS-HCC): ICD-10-CM

## 2024-01-31 PROCEDURE — 76819 FETAL BIOPHYS PROFIL W/O NST: CPT | Performed by: OBSTETRICS & GYNECOLOGY

## 2024-01-31 PROCEDURE — 76816 OB US FOLLOW-UP PER FETUS: CPT

## 2024-01-31 PROCEDURE — 76816 OB US FOLLOW-UP PER FETUS: CPT | Performed by: OBSTETRICS & GYNECOLOGY

## 2024-02-01 ENCOUNTER — APPOINTMENT (OUTPATIENT)
Dept: OBSTETRICS AND GYNECOLOGY | Facility: CLINIC | Age: 34
End: 2024-02-01
Payer: COMMERCIAL

## 2024-02-02 ENCOUNTER — APPOINTMENT (OUTPATIENT)
Dept: RADIOLOGY | Facility: CLINIC | Age: 34
End: 2024-02-02
Payer: COMMERCIAL

## 2024-02-02 ENCOUNTER — LAB (OUTPATIENT)
Dept: LAB | Facility: LAB | Age: 34
End: 2024-02-02
Payer: COMMERCIAL

## 2024-02-02 ENCOUNTER — PROCEDURE VISIT (OUTPATIENT)
Dept: OBSTETRICS AND GYNECOLOGY | Facility: CLINIC | Age: 34
End: 2024-02-02
Payer: COMMERCIAL

## 2024-02-02 VITALS — BODY MASS INDEX: 35.19 KG/M2 | DIASTOLIC BLOOD PRESSURE: 84 MMHG | WEIGHT: 218 LBS | SYSTOLIC BLOOD PRESSURE: 126 MMHG

## 2024-02-02 DIAGNOSIS — Z34.83 MULTIGRAVIDA IN THIRD TRIMESTER (HHS-HCC): Primary | ICD-10-CM

## 2024-02-02 DIAGNOSIS — O13.3 GESTATIONAL HYPERTENSION, THIRD TRIMESTER (HHS-HCC): ICD-10-CM

## 2024-02-02 DIAGNOSIS — Z3A.33 33 WEEKS GESTATION OF PREGNANCY (HHS-HCC): ICD-10-CM

## 2024-02-02 LAB
ALT SERPL W P-5'-P-CCNC: 12 U/L (ref 7–45)
AST SERPL W P-5'-P-CCNC: 18 U/L (ref 9–39)
CREAT SERPL-MCNC: 0.55 MG/DL (ref 0.5–1.05)
CREAT UR-MCNC: 20 MG/DL (ref 20–320)
EGFRCR SERPLBLD CKD-EPI 2021: >90 ML/MIN/1.73M*2
ERYTHROCYTE [DISTWIDTH] IN BLOOD BY AUTOMATED COUNT: 13.2 % (ref 11.5–14.5)
HCT VFR BLD AUTO: 36 % (ref 36–46)
HGB BLD-MCNC: 12.4 G/DL (ref 12–16)
LDH SERPL L TO P-CCNC: 162 U/L (ref 84–246)
MCH RBC QN AUTO: 31.8 PG (ref 26–34)
MCHC RBC AUTO-ENTMCNC: 34.4 G/DL (ref 32–36)
MCV RBC AUTO: 92 FL (ref 80–100)
NRBC BLD-RTO: 0 /100 WBCS (ref 0–0)
PLATELET # BLD AUTO: 212 X10*3/UL (ref 150–450)
POC GLUCOSE, URINE: NEGATIVE MG/DL
POC PROTEIN, URINE: NEGATIVE MG/DL
PROT UR-ACNC: <4 MG/DL (ref 5–24)
PROT/CREAT UR: ABNORMAL MG/G{CREAT}
RBC # BLD AUTO: 3.9 X10*6/UL (ref 4–5.2)
REFLEX ADDED, ANEMIA PANEL: NORMAL
URATE SERPL-MCNC: 5.1 MG/DL (ref 2.3–6.7)
WBC # BLD AUTO: 7.9 X10*3/UL (ref 4.4–11.3)

## 2024-02-02 PROCEDURE — 84156 ASSAY OF PROTEIN URINE: CPT

## 2024-02-02 PROCEDURE — 84550 ASSAY OF BLOOD/URIC ACID: CPT

## 2024-02-02 PROCEDURE — 83615 LACTATE (LD) (LDH) ENZYME: CPT

## 2024-02-02 PROCEDURE — 85027 COMPLETE CBC AUTOMATED: CPT

## 2024-02-02 PROCEDURE — 84460 ALANINE AMINO (ALT) (SGPT): CPT

## 2024-02-02 PROCEDURE — 82570 ASSAY OF URINE CREATININE: CPT

## 2024-02-02 PROCEDURE — 36415 COLL VENOUS BLD VENIPUNCTURE: CPT

## 2024-02-02 PROCEDURE — 84450 TRANSFERASE (AST) (SGOT): CPT

## 2024-02-02 PROCEDURE — 82565 ASSAY OF CREATININE: CPT

## 2024-02-02 PROCEDURE — 99213 OFFICE O/P EST LOW 20 MIN: CPT | Performed by: OBSTETRICS & GYNECOLOGY

## 2024-02-02 PROCEDURE — 59025 FETAL NON-STRESS TEST: CPT | Performed by: OBSTETRICS & GYNECOLOGY

## 2024-02-02 NOTE — PROGRESS NOTES
Subjective   Patient ID [id number]   Christine Navarro is a 33 y.o.  at 33w5d with a working estimated date of delivery of 3/17/2024, by Last Menstrual Period who presents for a routine prenatal visit. She denies vaginal bleeding, leakage of fluid, decreased fetal movements, or contractions.    Her pregnancy is complicated by:  AMA  GHTN  Hx preeclampsia  Hx LTCS  LGA    Objective   Physical Exam:   Weight: 98.9 kg (218 lb)  Expected Total Weight Gain: 5 kg (11 lb)-9 kg (19 lb)   Pregravid BMI: 31.65  BP: 126/84                  Prenatal Labs  Urine Dip:  Lab Results   Component Value Date    KETONESU NEGATIVE 2024     Lab Results   Component Value Date    HGB 13.0 2024    HCT 38.9 2024    ABO A 2023    HEPBSAG NONREACTIVE 2023           Imaging: Growth US in 2 weeks    Assessment/Plan   Problem List Items Addressed This Visit             ICD-10-CM    Gestational hypertension, third trimester O13.3    Relevant Orders    Fetal nonstress test    CBC Anemia Panel With Reflex, Pregnancy    Aspartate Aminotransferase    Alanine Aminotransferase    Uric Acid    Creatinine    Lactate Dehydrogenase    Protein, Urine Random (P:C Ratio)     Other Visit Diagnoses         Codes    Multigravida in third trimester    -  Primary Z34.83    Patient doing well  Precautions given  RTO 1 week     33 weeks gestation of pregnancy     Z3A.33    Relevant Orders    POCT UA Automated manually resulted (Completed)          Continue prenatal vitamin.  Labs reviewed.  GBS 35 weeks  Expected mode of delivery TBD  Follow up in 1 week for a routine prenatal visit.  Viridiana Morrison DO

## 2024-02-02 NOTE — PATIENT INSTRUCTIONS
You were seen in the office today for routine OB care and had normal findings on exam  Continue routine OB precautions at home  Continue taking prenatal vitamins   Avoid sick contacts and consider getting your Flu (available in office during flu season) and COVID vaccines to protect against infection in pregnancy  We recommend getting the Tdap (available in office) and RSV vaccines to pass some immunity from mother to baby and protect your baby against RSV and Whooping cough in the first few months of life. Tdap can be given between 27 and 36 weeks, and RSV vaccinations can be given between 32 and 36 weeks gestation  Make an appointment for routine care in the office in the next 2 weeks  If you are having any painful contractions, vaginal bleeding, leaking amniotic fluid, or decrease in baby's movements prior to your next visit please call the office to speak to the physician on call. This includes after hours, weekends, and holidays, when the answering service will be able to connect you with the physician on call. [phone] ([place] [facility]) or [phone] ([place] [facility]).

## 2024-02-02 NOTE — PROCEDURES
reno Villarreal  at 33w5d with an CARRIE of 3/17/2024, by Last Menstrual Period, was seen at [facility] for a nonstress test.    Non-Stress Test   Variability in Waveform for Non-Stress Test: Moderate  Accelerations in Non-Stress Test: Yes  Decelerations in Non-Stress Test: None  Contractions in Non-Stress Test: Not present  Acoustic Stimulator for Non-Stress Test: No  Interpretation of Non-Stress Test   Interpretation of Non-Stress Test: Reactive  Comments on Non-Stress Test: reassuring  NST for Multiple Fetuses: No      Viridiana ADRIAN Morrison, DO

## 2024-02-05 ENCOUNTER — PROCEDURE VISIT (OUTPATIENT)
Dept: OBSTETRICS AND GYNECOLOGY | Facility: CLINIC | Age: 34
End: 2024-02-05
Payer: COMMERCIAL

## 2024-02-05 VITALS — SYSTOLIC BLOOD PRESSURE: 128 MMHG | BODY MASS INDEX: 35.19 KG/M2 | DIASTOLIC BLOOD PRESSURE: 80 MMHG | WEIGHT: 218 LBS

## 2024-02-05 DIAGNOSIS — Z34.83 PRENATAL CARE, SUBSEQUENT PREGNANCY, THIRD TRIMESTER (HHS-HCC): ICD-10-CM

## 2024-02-05 DIAGNOSIS — Z3A.34 34 WEEKS GESTATION OF PREGNANCY (HHS-HCC): ICD-10-CM

## 2024-02-05 DIAGNOSIS — O13.3 GESTATIONAL HYPERTENSION, THIRD TRIMESTER (HHS-HCC): Primary | ICD-10-CM

## 2024-02-05 PROCEDURE — 59025 FETAL NON-STRESS TEST: CPT | Performed by: OBSTETRICS & GYNECOLOGY

## 2024-02-05 PROCEDURE — 99213 OFFICE O/P EST LOW 20 MIN: CPT | Performed by: OBSTETRICS & GYNECOLOGY

## 2024-02-08 ENCOUNTER — LAB (OUTPATIENT)
Dept: LAB | Facility: LAB | Age: 34
End: 2024-02-08
Payer: COMMERCIAL

## 2024-02-08 ENCOUNTER — APPOINTMENT (OUTPATIENT)
Dept: RADIOLOGY | Facility: CLINIC | Age: 34
End: 2024-02-08
Payer: COMMERCIAL

## 2024-02-08 ENCOUNTER — PROCEDURE VISIT (OUTPATIENT)
Dept: OBSTETRICS AND GYNECOLOGY | Facility: CLINIC | Age: 34
End: 2024-02-08
Payer: COMMERCIAL

## 2024-02-08 VITALS — SYSTOLIC BLOOD PRESSURE: 140 MMHG | DIASTOLIC BLOOD PRESSURE: 90 MMHG | BODY MASS INDEX: 35.19 KG/M2 | WEIGHT: 218 LBS

## 2024-02-08 DIAGNOSIS — Z3A.32 32 WEEKS GESTATION OF PREGNANCY (HHS-HCC): ICD-10-CM

## 2024-02-08 DIAGNOSIS — O13.3 GESTATIONAL HYPERTENSION, THIRD TRIMESTER (HHS-HCC): ICD-10-CM

## 2024-02-08 DIAGNOSIS — Z3A.34 34 WEEKS GESTATION OF PREGNANCY (HHS-HCC): ICD-10-CM

## 2024-02-08 DIAGNOSIS — Z3A.26 26 WEEKS GESTATION OF PREGNANCY (HHS-HCC): ICD-10-CM

## 2024-02-08 LAB
ALT SERPL W P-5'-P-CCNC: 10 U/L (ref 7–45)
AST SERPL W P-5'-P-CCNC: 17 U/L (ref 9–39)
CREAT SERPL-MCNC: 0.66 MG/DL (ref 0.5–1.05)
EGFRCR SERPLBLD CKD-EPI 2021: >90 ML/MIN/1.73M*2
ERYTHROCYTE [DISTWIDTH] IN BLOOD BY AUTOMATED COUNT: 13.3 % (ref 11.5–14.5)
HCT VFR BLD AUTO: 36.9 % (ref 36–46)
HGB BLD-MCNC: 12.5 G/DL (ref 12–16)
LDH SERPL L TO P-CCNC: 140 U/L (ref 84–246)
MCH RBC QN AUTO: 31.8 PG (ref 26–34)
MCHC RBC AUTO-ENTMCNC: 33.9 G/DL (ref 32–36)
MCV RBC AUTO: 94 FL (ref 80–100)
NRBC BLD-RTO: 0 /100 WBCS (ref 0–0)
PLATELET # BLD AUTO: 189 X10*3/UL (ref 150–450)
POC GLUCOSE, URINE: NEGATIVE MG/DL
POC PROTEIN, URINE: ABNORMAL MG/DL
RBC # BLD AUTO: 3.93 X10*6/UL (ref 4–5.2)
URATE SERPL-MCNC: 5.1 MG/DL (ref 2.3–6.7)
WBC # BLD AUTO: 8.3 X10*3/UL (ref 4.4–11.3)

## 2024-02-08 PROCEDURE — 59025 FETAL NON-STRESS TEST: CPT | Performed by: OBSTETRICS & GYNECOLOGY

## 2024-02-08 PROCEDURE — 82570 ASSAY OF URINE CREATININE: CPT

## 2024-02-08 PROCEDURE — 84550 ASSAY OF BLOOD/URIC ACID: CPT

## 2024-02-08 PROCEDURE — 84460 ALANINE AMINO (ALT) (SGPT): CPT

## 2024-02-08 PROCEDURE — 84450 TRANSFERASE (AST) (SGOT): CPT

## 2024-02-08 PROCEDURE — 59425 ANTEPARTUM CARE ONLY: CPT | Performed by: OBSTETRICS & GYNECOLOGY

## 2024-02-08 PROCEDURE — 83615 LACTATE (LD) (LDH) ENZYME: CPT

## 2024-02-08 PROCEDURE — 85027 COMPLETE CBC AUTOMATED: CPT

## 2024-02-08 PROCEDURE — 84156 ASSAY OF PROTEIN URINE: CPT

## 2024-02-08 PROCEDURE — 36415 COLL VENOUS BLD VENIPUNCTURE: CPT

## 2024-02-08 PROCEDURE — 82565 ASSAY OF CREATININE: CPT

## 2024-02-08 NOTE — PROGRESS NOTES
Subjective   Christine Navarro is a 33 y.o.  at 34w4d, presents for a routine prenatal visit.   Occasional mild range BP's at home.  No headache, no blurred vision. Overall feels well.  Growth ultrasound next week.   Last one 2024    Objective     /90   Wt 98.9 kg (218 lb)   LMP 2023   BMI 35.19 kg/m²   NST cat 1 tracing    Plan  34 4/7 weeks   GHTN  NST cat 1 tracing.  PEC blood work today.  Continue NST's twice weekly    Audra Monsivais MD

## 2024-02-10 LAB
CREAT UR-MCNC: 137.6 MG/DL
PROT UR-MCNC: 16 MG/DL
PROT/CREAT UR: 0.12 MG/MG CREAT

## 2024-02-12 ENCOUNTER — PROCEDURE VISIT (OUTPATIENT)
Dept: OBSTETRICS AND GYNECOLOGY | Facility: CLINIC | Age: 34
End: 2024-02-12
Payer: COMMERCIAL

## 2024-02-12 ENCOUNTER — ANESTHESIA EVENT (OUTPATIENT)
Dept: OBSTETRICS AND GYNECOLOGY | Facility: HOSPITAL | Age: 34
End: 2024-02-12
Payer: COMMERCIAL

## 2024-02-12 ENCOUNTER — ANESTHESIA (OUTPATIENT)
Dept: OBSTETRICS AND GYNECOLOGY | Facility: HOSPITAL | Age: 34
End: 2024-02-12
Payer: COMMERCIAL

## 2024-02-12 ENCOUNTER — HOSPITAL ENCOUNTER (INPATIENT)
Facility: HOSPITAL | Age: 34
LOS: 6 days | Discharge: HOME | End: 2024-02-18
Attending: STUDENT IN AN ORGANIZED HEALTH CARE EDUCATION/TRAINING PROGRAM | Admitting: STUDENT IN AN ORGANIZED HEALTH CARE EDUCATION/TRAINING PROGRAM
Payer: COMMERCIAL

## 2024-02-12 ENCOUNTER — HOSPITAL ENCOUNTER (OUTPATIENT)
Facility: HOSPITAL | Age: 34
Setting detail: OBSERVATION
LOS: 1 days | Discharge: HOSPICE | End: 2024-02-12
Attending: OBSTETRICS & GYNECOLOGY | Admitting: OBSTETRICS & GYNECOLOGY
Payer: COMMERCIAL

## 2024-02-12 VITALS — SYSTOLIC BLOOD PRESSURE: 160 MMHG | WEIGHT: 219 LBS | BODY MASS INDEX: 35.35 KG/M2 | DIASTOLIC BLOOD PRESSURE: 95 MMHG

## 2024-02-12 VITALS
BODY MASS INDEX: 35.8 KG/M2 | RESPIRATION RATE: 18 BRPM | WEIGHT: 222.78 LBS | HEIGHT: 66 IN | SYSTOLIC BLOOD PRESSURE: 123 MMHG | TEMPERATURE: 97.7 F | OXYGEN SATURATION: 96 % | DIASTOLIC BLOOD PRESSURE: 75 MMHG | HEART RATE: 93 BPM

## 2024-02-12 DIAGNOSIS — O14.13 PREECLAMPSIA, SEVERE, THIRD TRIMESTER (HHS-HCC): Primary | ICD-10-CM

## 2024-02-12 DIAGNOSIS — Z3A.35 35 WEEKS GESTATION OF PREGNANCY (HHS-HCC): ICD-10-CM

## 2024-02-12 DIAGNOSIS — O13.3 GESTATIONAL HYPERTENSION, THIRD TRIMESTER (HHS-HCC): ICD-10-CM

## 2024-02-12 PROBLEM — K21.9 GASTROESOPHAGEAL REFLUX DISEASE: Status: ACTIVE | Noted: 2024-02-12

## 2024-02-12 PROBLEM — O14.93 PREECLAMPSIA, THIRD TRIMESTER (HHS-HCC): Status: ACTIVE | Noted: 2024-02-12

## 2024-02-12 LAB
ALBUMIN SERPL BCP-MCNC: 3.5 G/DL (ref 3.4–5)
ALBUMIN SERPL BCP-MCNC: 3.8 G/DL (ref 3.4–5)
ALP SERPL-CCNC: 78 U/L (ref 33–110)
ALP SERPL-CCNC: 93 U/L (ref 33–110)
ALT SERPL W P-5'-P-CCNC: 11 U/L (ref 7–45)
ALT SERPL W P-5'-P-CCNC: 12 U/L (ref 7–45)
ANION GAP SERPL CALC-SCNC: 15 MMOL/L (ref 10–20)
ANION GAP SERPL CALC-SCNC: 18 MMOL/L (ref 10–20)
AST SERPL W P-5'-P-CCNC: 17 U/L (ref 9–39)
AST SERPL W P-5'-P-CCNC: 18 U/L (ref 9–39)
BILIRUB SERPL-MCNC: 0.3 MG/DL (ref 0–1.2)
BILIRUB SERPL-MCNC: 0.5 MG/DL (ref 0–1.2)
BUN SERPL-MCNC: 7 MG/DL (ref 6–23)
BUN SERPL-MCNC: 8 MG/DL (ref 6–23)
CALCIUM SERPL-MCNC: 8.7 MG/DL (ref 8.6–10.3)
CALCIUM SERPL-MCNC: 8.7 MG/DL (ref 8.6–10.6)
CHLORIDE SERPL-SCNC: 101 MMOL/L (ref 98–107)
CHLORIDE SERPL-SCNC: 101 MMOL/L (ref 98–107)
CO2 SERPL-SCNC: 20 MMOL/L (ref 21–32)
CO2 SERPL-SCNC: 21 MMOL/L (ref 21–32)
CREAT SERPL-MCNC: 0.45 MG/DL (ref 0.5–1.05)
CREAT SERPL-MCNC: 0.47 MG/DL (ref 0.5–1.05)
CREAT UR-MCNC: 18.1 MG/DL (ref 20–320)
EGFRCR SERPLBLD CKD-EPI 2021: >90 ML/MIN/1.73M*2
EGFRCR SERPLBLD CKD-EPI 2021: >90 ML/MIN/1.73M*2
ERYTHROCYTE [DISTWIDTH] IN BLOOD BY AUTOMATED COUNT: 13 % (ref 11.5–14.5)
ERYTHROCYTE [DISTWIDTH] IN BLOOD BY AUTOMATED COUNT: 13.2 % (ref 11.5–14.5)
GLUCOSE SERPL-MCNC: 113 MG/DL (ref 74–99)
GLUCOSE SERPL-MCNC: 122 MG/DL (ref 74–99)
HCT VFR BLD AUTO: 35 % (ref 36–46)
HCT VFR BLD AUTO: 37.6 % (ref 36–46)
HGB BLD-MCNC: 12.1 G/DL (ref 12–16)
HGB BLD-MCNC: 12.9 G/DL (ref 12–16)
LDH SERPL L TO P-CCNC: 157 U/L (ref 84–246)
MCH RBC QN AUTO: 31.4 PG (ref 26–34)
MCH RBC QN AUTO: 31.8 PG (ref 26–34)
MCHC RBC AUTO-ENTMCNC: 34.3 G/DL (ref 32–36)
MCHC RBC AUTO-ENTMCNC: 34.6 G/DL (ref 32–36)
MCV RBC AUTO: 91 FL (ref 80–100)
MCV RBC AUTO: 93 FL (ref 80–100)
NRBC BLD-RTO: 0 /100 WBCS (ref 0–0)
NRBC BLD-RTO: 0 /100 WBCS (ref 0–0)
PLATELET # BLD AUTO: 188 X10*3/UL (ref 150–450)
PLATELET # BLD AUTO: 208 X10*3/UL (ref 150–450)
POC GLUCOSE, URINE: NEGATIVE MG/DL
POC PROTEIN, URINE: ABNORMAL MG/DL
POTASSIUM SERPL-SCNC: 3.5 MMOL/L (ref 3.5–5.3)
POTASSIUM SERPL-SCNC: 3.9 MMOL/L (ref 3.5–5.3)
PROT SERPL-MCNC: 6.2 G/DL (ref 6.4–8.2)
PROT SERPL-MCNC: 6.5 G/DL (ref 6.4–8.2)
PROT UR-ACNC: 5 MG/DL (ref 5–24)
PROT/CREAT UR: 0.28 MG/MG CREAT (ref 0–0.17)
RBC # BLD AUTO: 3.85 X10*6/UL (ref 4–5.2)
RBC # BLD AUTO: 4.06 X10*6/UL (ref 4–5.2)
SODIUM SERPL-SCNC: 133 MMOL/L (ref 136–145)
SODIUM SERPL-SCNC: 135 MMOL/L (ref 136–145)
TREPONEMA PALLIDUM IGG+IGM AB [PRESENCE] IN SERUM OR PLASMA BY IMMUNOASSAY: NONREACTIVE
URATE SERPL-MCNC: 4.7 MG/DL (ref 2.3–6.7)
WBC # BLD AUTO: 11.3 X10*3/UL (ref 4.4–11.3)
WBC # BLD AUTO: 8.2 X10*3/UL (ref 4.4–11.3)

## 2024-02-12 PROCEDURE — 87081 CULTURE SCREEN ONLY: CPT

## 2024-02-12 PROCEDURE — 0501F PRENATAL FLOW SHEET: CPT | Performed by: OBSTETRICS & GYNECOLOGY

## 2024-02-12 PROCEDURE — 85027 COMPLETE CBC AUTOMATED: CPT | Performed by: OBSTETRICS & GYNECOLOGY

## 2024-02-12 PROCEDURE — 83615 LACTATE (LD) (LDH) ENZYME: CPT | Performed by: OBSTETRICS & GYNECOLOGY

## 2024-02-12 PROCEDURE — 36415 COLL VENOUS BLD VENIPUNCTURE: CPT | Performed by: OBSTETRICS & GYNECOLOGY

## 2024-02-12 PROCEDURE — 84550 ASSAY OF BLOOD/URIC ACID: CPT | Performed by: OBSTETRICS & GYNECOLOGY

## 2024-02-12 PROCEDURE — G0378 HOSPITAL OBSERVATION PER HR: HCPCS

## 2024-02-12 PROCEDURE — 81003 URINALYSIS AUTO W/O SCOPE: CPT | Performed by: OBSTETRICS & GYNECOLOGY

## 2024-02-12 PROCEDURE — 99215 OFFICE O/P EST HI 40 MIN: CPT | Performed by: OBSTETRICS & GYNECOLOGY

## 2024-02-12 PROCEDURE — 96374 THER/PROPH/DIAG INJ IV PUSH: CPT

## 2024-02-12 PROCEDURE — 80053 COMPREHEN METABOLIC PANEL: CPT | Performed by: OBSTETRICS & GYNECOLOGY

## 2024-02-12 PROCEDURE — 86920 COMPATIBILITY TEST SPIN: CPT

## 2024-02-12 PROCEDURE — 86901 BLOOD TYPING SEROLOGIC RH(D): CPT

## 2024-02-12 PROCEDURE — 85027 COMPLETE CBC AUTOMATED: CPT

## 2024-02-12 PROCEDURE — 96375 TX/PRO/DX INJ NEW DRUG ADDON: CPT

## 2024-02-12 PROCEDURE — 2500000004 HC RX 250 GENERAL PHARMACY W/ HCPCS (ALT 636 FOR OP/ED)

## 2024-02-12 PROCEDURE — 82570 ASSAY OF URINE CREATININE: CPT | Performed by: OBSTETRICS & GYNECOLOGY

## 2024-02-12 PROCEDURE — 59050 FETAL MONITOR W/REPORT: CPT

## 2024-02-12 PROCEDURE — 80053 COMPREHEN METABOLIC PANEL: CPT

## 2024-02-12 PROCEDURE — 2500000004 HC RX 250 GENERAL PHARMACY W/ HCPCS (ALT 636 FOR OP/ED): Performed by: OBSTETRICS & GYNECOLOGY

## 2024-02-12 PROCEDURE — 36415 COLL VENOUS BLD VENIPUNCTURE: CPT

## 2024-02-12 PROCEDURE — 86780 TREPONEMA PALLIDUM: CPT

## 2024-02-12 PROCEDURE — 2500000002 HC RX 250 W HCPCS SELF ADMINISTERED DRUGS (ALT 637 FOR MEDICARE OP, ALT 636 FOR OP/ED): Performed by: STUDENT IN AN ORGANIZED HEALTH CARE EDUCATION/TRAINING PROGRAM

## 2024-02-12 PROCEDURE — 1120000001 HC OB PRIVATE ROOM DAILY

## 2024-02-12 RX ORDER — ACETAMINOPHEN 325 MG/1
975 TABLET ORAL ONCE
Status: COMPLETED | OUTPATIENT
Start: 2024-02-12 | End: 2024-02-12

## 2024-02-12 RX ORDER — METOCLOPRAMIDE HYDROCHLORIDE 5 MG/ML
10 INJECTION INTRAMUSCULAR; INTRAVENOUS EVERY 6 HOURS PRN
Status: DISCONTINUED | OUTPATIENT
Start: 2024-02-12 | End: 2024-02-13

## 2024-02-12 RX ORDER — LIDOCAINE HYDROCHLORIDE 10 MG/ML
30 INJECTION INFILTRATION; PERINEURAL ONCE AS NEEDED
Status: DISCONTINUED | OUTPATIENT
Start: 2024-02-12 | End: 2024-02-13

## 2024-02-12 RX ORDER — SODIUM CHLORIDE, SODIUM LACTATE, POTASSIUM CHLORIDE, CALCIUM CHLORIDE 600; 310; 30; 20 MG/100ML; MG/100ML; MG/100ML; MG/100ML
75 INJECTION, SOLUTION INTRAVENOUS CONTINUOUS
Status: DISCONTINUED | OUTPATIENT
Start: 2024-02-12 | End: 2024-02-14

## 2024-02-12 RX ORDER — ONDANSETRON 4 MG/1
4 TABLET, FILM COATED ORAL EVERY 6 HOURS PRN
Status: DISCONTINUED | OUTPATIENT
Start: 2024-02-12 | End: 2024-02-12 | Stop reason: HOSPADM

## 2024-02-12 RX ORDER — TRANEXAMIC ACID 100 MG/ML
1000 INJECTION, SOLUTION INTRAVENOUS ONCE AS NEEDED
Status: DISCONTINUED | OUTPATIENT
Start: 2024-02-12 | End: 2024-02-13

## 2024-02-12 RX ORDER — HYDRALAZINE HYDROCHLORIDE 20 MG/ML
5 INJECTION INTRAMUSCULAR; INTRAVENOUS ONCE AS NEEDED
Status: DISCONTINUED | OUTPATIENT
Start: 2024-02-12 | End: 2024-02-12 | Stop reason: HOSPADM

## 2024-02-12 RX ORDER — CALCIUM GLUCONATE 98 MG/ML
1 INJECTION, SOLUTION INTRAVENOUS ONCE AS NEEDED
Status: DISCONTINUED | OUTPATIENT
Start: 2024-02-12 | End: 2024-02-14

## 2024-02-12 RX ORDER — LOPERAMIDE HYDROCHLORIDE 2 MG/1
4 CAPSULE ORAL EVERY 2 HOUR PRN
Status: DISCONTINUED | OUTPATIENT
Start: 2024-02-12 | End: 2024-02-14 | Stop reason: HOSPADM

## 2024-02-12 RX ORDER — ONDANSETRON HYDROCHLORIDE 2 MG/ML
4 INJECTION, SOLUTION INTRAVENOUS EVERY 6 HOURS PRN
Status: DISCONTINUED | OUTPATIENT
Start: 2024-02-12 | End: 2024-02-12 | Stop reason: HOSPADM

## 2024-02-12 RX ORDER — METHYLERGONOVINE MALEATE 0.2 MG/ML
0.2 INJECTION INTRAVENOUS ONCE AS NEEDED
Status: DISCONTINUED | OUTPATIENT
Start: 2024-02-12 | End: 2024-02-13

## 2024-02-12 RX ORDER — CARBOPROST TROMETHAMINE 250 UG/ML
250 INJECTION, SOLUTION INTRAMUSCULAR ONCE AS NEEDED
Status: DISCONTINUED | OUTPATIENT
Start: 2024-02-12 | End: 2024-02-13

## 2024-02-12 RX ORDER — MISOPROSTOL 200 UG/1
800 TABLET ORAL ONCE AS NEEDED
Status: DISCONTINUED | OUTPATIENT
Start: 2024-02-12 | End: 2024-02-13

## 2024-02-12 RX ORDER — OXYTOCIN/0.9 % SODIUM CHLORIDE 30/500 ML
60 PLASTIC BAG, INJECTION (ML) INTRAVENOUS ONCE AS NEEDED
Status: DISCONTINUED | OUTPATIENT
Start: 2024-02-12 | End: 2024-02-14 | Stop reason: HOSPADM

## 2024-02-12 RX ORDER — LIDOCAINE HYDROCHLORIDE 10 MG/ML
0.5 INJECTION INFILTRATION; PERINEURAL ONCE AS NEEDED
Status: DISCONTINUED | OUTPATIENT
Start: 2024-02-12 | End: 2024-02-12 | Stop reason: HOSPADM

## 2024-02-12 RX ORDER — OXYTOCIN 10 [USP'U]/ML
10 INJECTION, SOLUTION INTRAMUSCULAR; INTRAVENOUS ONCE AS NEEDED
Status: DISCONTINUED | OUTPATIENT
Start: 2024-02-12 | End: 2024-02-14 | Stop reason: HOSPADM

## 2024-02-12 RX ORDER — MAGNESIUM SULFATE HEPTAHYDRATE 40 MG/ML
2 INJECTION, SOLUTION INTRAVENOUS CONTINUOUS
Status: DISCONTINUED | OUTPATIENT
Start: 2024-02-12 | End: 2024-02-12 | Stop reason: HOSPADM

## 2024-02-12 RX ORDER — NIFEDIPINE 10 MG/1
10 CAPSULE ORAL ONCE AS NEEDED
Status: DISCONTINUED | OUTPATIENT
Start: 2024-02-12 | End: 2024-02-14 | Stop reason: HOSPADM

## 2024-02-12 RX ORDER — LABETALOL HYDROCHLORIDE 5 MG/ML
20 INJECTION, SOLUTION INTRAVENOUS ONCE AS NEEDED
Status: DISCONTINUED | OUTPATIENT
Start: 2024-02-12 | End: 2024-02-14 | Stop reason: HOSPADM

## 2024-02-12 RX ORDER — DIPHENHYDRAMINE HCL 25 MG
25 CAPSULE ORAL ONCE
Status: COMPLETED | OUTPATIENT
Start: 2024-02-12 | End: 2024-02-13

## 2024-02-12 RX ORDER — CALCIUM GLUCONATE 98 MG/ML
1 INJECTION, SOLUTION INTRAVENOUS ONCE AS NEEDED
Status: DISCONTINUED | OUTPATIENT
Start: 2024-02-12 | End: 2024-02-12 | Stop reason: HOSPADM

## 2024-02-12 RX ORDER — TERBUTALINE SULFATE 1 MG/ML
0.25 INJECTION SUBCUTANEOUS ONCE AS NEEDED
Status: DISCONTINUED | OUTPATIENT
Start: 2024-02-12 | End: 2024-02-14 | Stop reason: HOSPADM

## 2024-02-12 RX ORDER — ONDANSETRON HYDROCHLORIDE 2 MG/ML
4 INJECTION, SOLUTION INTRAVENOUS EVERY 6 HOURS PRN
Status: DISCONTINUED | OUTPATIENT
Start: 2024-02-12 | End: 2024-02-14

## 2024-02-12 RX ORDER — NIFEDIPINE 30 MG/1
30 TABLET, FILM COATED, EXTENDED RELEASE ORAL
Status: DISCONTINUED | OUTPATIENT
Start: 2024-02-13 | End: 2024-02-12

## 2024-02-12 RX ORDER — HYDRALAZINE HYDROCHLORIDE 20 MG/ML
5 INJECTION INTRAMUSCULAR; INTRAVENOUS ONCE AS NEEDED
Status: DISCONTINUED | OUTPATIENT
Start: 2024-02-12 | End: 2024-02-14 | Stop reason: HOSPADM

## 2024-02-12 RX ORDER — MAGNESIUM SULFATE HEPTAHYDRATE 40 MG/ML
2 INJECTION, SOLUTION INTRAVENOUS CONTINUOUS
Status: DISCONTINUED | OUTPATIENT
Start: 2024-02-12 | End: 2024-02-14

## 2024-02-12 RX ORDER — BETAMETHASONE SODIUM PHOSPHATE AND BETAMETHASONE ACETATE 3; 3 MG/ML; MG/ML
12 INJECTION, SUSPENSION INTRA-ARTICULAR; INTRALESIONAL; INTRAMUSCULAR; SOFT TISSUE ONCE
Status: COMPLETED | OUTPATIENT
Start: 2024-02-12 | End: 2024-02-12

## 2024-02-12 RX ORDER — BETAMETHASONE SODIUM PHOSPHATE AND BETAMETHASONE ACETATE 3; 3 MG/ML; MG/ML
12 INJECTION, SUSPENSION INTRA-ARTICULAR; INTRALESIONAL; INTRAMUSCULAR; SOFT TISSUE ONCE
Status: DISCONTINUED | OUTPATIENT
Start: 2024-02-13 | End: 2024-02-14

## 2024-02-12 RX ORDER — LABETALOL HYDROCHLORIDE 5 MG/ML
20 INJECTION, SOLUTION INTRAVENOUS ONCE AS NEEDED
Status: COMPLETED | OUTPATIENT
Start: 2024-02-12 | End: 2024-02-12

## 2024-02-12 RX ORDER — NIFEDIPINE 30 MG/1
30 TABLET, FILM COATED, EXTENDED RELEASE ORAL
Status: DISCONTINUED | OUTPATIENT
Start: 2024-02-12 | End: 2024-02-14

## 2024-02-12 RX ORDER — ONDANSETRON 4 MG/1
4 TABLET, FILM COATED ORAL EVERY 6 HOURS PRN
Status: DISCONTINUED | OUTPATIENT
Start: 2024-02-12 | End: 2024-02-14

## 2024-02-12 RX ORDER — NIFEDIPINE 10 MG/1
10 CAPSULE ORAL ONCE AS NEEDED
Status: DISCONTINUED | OUTPATIENT
Start: 2024-02-12 | End: 2024-02-12 | Stop reason: HOSPADM

## 2024-02-12 RX ORDER — METOCLOPRAMIDE 10 MG/1
10 TABLET ORAL EVERY 6 HOURS PRN
Status: DISCONTINUED | OUTPATIENT
Start: 2024-02-12 | End: 2024-02-13

## 2024-02-12 RX ADMIN — BETAMETHASONE SODIUM PHOSPHATE AND BETAMETHASONE ACETATE 12 MG: 3; 3 INJECTION, SUSPENSION INTRA-ARTICULAR; INTRALESIONAL; INTRAMUSCULAR at 17:35

## 2024-02-12 RX ADMIN — MAGNESIUM SULFATE HEPTAHYDRATE 2 G/HR: 40 INJECTION, SOLUTION INTRAVENOUS at 17:48

## 2024-02-12 RX ADMIN — ACETAMINOPHEN 975 MG: 325 TABLET ORAL at 22:56

## 2024-02-12 RX ADMIN — LABETALOL HYDROCHLORIDE 20 MG: 5 INJECTION, SOLUTION INTRAVENOUS at 17:14

## 2024-02-12 RX ADMIN — ACETAMINOPHEN 975 MG: 325 TABLET ORAL at 16:48

## 2024-02-12 RX ADMIN — SODIUM CHLORIDE, POTASSIUM CHLORIDE, SODIUM LACTATE AND CALCIUM CHLORIDE 125 ML/HR: 600; 310; 30; 20 INJECTION, SOLUTION INTRAVENOUS at 21:00

## 2024-02-12 RX ADMIN — NIFEDIPINE 30 MG: 30 TABLET, FILM COATED, EXTENDED RELEASE ORAL at 23:13

## 2024-02-12 SDOH — HEALTH STABILITY: MENTAL HEALTH: NON-SPECIFIC ACTIVE SUICIDAL THOUGHTS (PAST 1 MONTH): NO

## 2024-02-12 SDOH — HEALTH STABILITY: MENTAL HEALTH: WISH TO BE DEAD (PAST 1 MONTH): NO

## 2024-02-12 SDOH — SOCIAL STABILITY: SOCIAL INSECURITY: DOES ANYONE TRY TO KEEP YOU FROM HAVING/CONTACTING OTHER FRIENDS OR DOING THINGS OUTSIDE YOUR HOME?: NO

## 2024-02-12 SDOH — SOCIAL STABILITY: SOCIAL INSECURITY: HAVE YOU HAD THOUGHTS OF HARMING ANYONE ELSE?: NO

## 2024-02-12 SDOH — SOCIAL STABILITY: SOCIAL INSECURITY: DO YOU FEEL ANYONE HAS EXPLOITED OR TAKEN ADVANTAGE OF YOU FINANCIALLY OR OF YOUR PERSONAL PROPERTY?: NO

## 2024-02-12 SDOH — HEALTH STABILITY: MENTAL HEALTH: SUICIDAL BEHAVIOR (LIFETIME): NO

## 2024-02-12 SDOH — ECONOMIC STABILITY: HOUSING INSECURITY: DO YOU FEEL UNSAFE GOING BACK TO THE PLACE WHERE YOU ARE LIVING?: NO

## 2024-02-12 SDOH — HEALTH STABILITY: MENTAL HEALTH: HAVE YOU USED ANY PRESCRIPTION DRUGS OTHER THAN PRESCRIBED IN THE PAST 12 MONTHS?: NO

## 2024-02-12 SDOH — HEALTH STABILITY: MENTAL HEALTH: WERE YOU ABLE TO COMPLETE ALL THE BEHAVIORAL HEALTH SCREENINGS?: NO

## 2024-02-12 SDOH — SOCIAL STABILITY: SOCIAL INSECURITY: PHYSICAL ABUSE: DENIES

## 2024-02-12 SDOH — SOCIAL STABILITY: SOCIAL INSECURITY: HAS ANYONE EVER THREATENED TO HURT YOUR FAMILY OR YOUR PETS?: NO

## 2024-02-12 SDOH — SOCIAL STABILITY: SOCIAL INSECURITY: ABUSE SCREEN: ADULT

## 2024-02-12 SDOH — SOCIAL STABILITY: SOCIAL INSECURITY: ARE THERE ANY APPARENT SIGNS OF INJURIES/BEHAVIORS THAT COULD BE RELATED TO ABUSE/NEGLECT?: NO

## 2024-02-12 SDOH — HEALTH STABILITY: MENTAL HEALTH: WERE YOU ABLE TO COMPLETE ALL THE BEHAVIORAL HEALTH SCREENINGS?: YES

## 2024-02-12 SDOH — SOCIAL STABILITY: SOCIAL INSECURITY: VERBAL ABUSE: DENIES

## 2024-02-12 SDOH — SOCIAL STABILITY: SOCIAL INSECURITY: ARE YOU OR HAVE YOU BEEN THREATENED OR ABUSED PHYSICALLY, EMOTIONALLY, OR SEXUALLY BY ANYONE?: NO

## 2024-02-12 SDOH — HEALTH STABILITY: MENTAL HEALTH: HAVE YOU USED ANY SUBSTANCES (CANABIS, COCAINE, HEROIN, HALLUCINOGENS, INHALANTS, ETC.) IN THE PAST 12 MONTHS?: NO

## 2024-02-12 ASSESSMENT — PATIENT HEALTH QUESTIONNAIRE - PHQ9
2. FEELING DOWN, DEPRESSED OR HOPELESS: NOT AT ALL
SUM OF ALL RESPONSES TO PHQ9 QUESTIONS 1 & 2: 0
2. FEELING DOWN, DEPRESSED OR HOPELESS: NOT AT ALL
1. LITTLE INTEREST OR PLEASURE IN DOING THINGS: NOT AT ALL
SUM OF ALL RESPONSES TO PHQ9 QUESTIONS 1 & 2: 0
1. LITTLE INTEREST OR PLEASURE IN DOING THINGS: NOT AT ALL

## 2024-02-12 ASSESSMENT — LIFESTYLE VARIABLES
HOW OFTEN DO YOU HAVE 6 OR MORE DRINKS ON ONE OCCASION: NEVER
HOW MANY STANDARD DRINKS CONTAINING ALCOHOL DO YOU HAVE ON A TYPICAL DAY: PATIENT DOES NOT DRINK
SKIP TO QUESTIONS 9-10: 1
HOW MANY STANDARD DRINKS CONTAINING ALCOHOL DO YOU HAVE ON A TYPICAL DAY: PATIENT DOES NOT DRINK
AUDIT-C TOTAL SCORE: 0
HOW OFTEN DO YOU HAVE A DRINK CONTAINING ALCOHOL: NEVER
HOW OFTEN DO YOU HAVE A DRINK CONTAINING ALCOHOL: NEVER
AUDIT-C TOTAL SCORE: 0
AUDIT-C TOTAL SCORE: 0
SKIP TO QUESTIONS 9-10: 1
AUDIT-C TOTAL SCORE: 0
HOW OFTEN DO YOU HAVE 6 OR MORE DRINKS ON ONE OCCASION: NEVER

## 2024-02-12 ASSESSMENT — PAIN DESCRIPTION - DESCRIPTORS
DESCRIPTORS: ACHING

## 2024-02-12 ASSESSMENT — PAIN SCALES - GENERAL
PAINLEVEL_OUTOF10: 5 - MODERATE PAIN
PAINLEVEL_OUTOF10: 8
PAINLEVEL_OUTOF10: 1
PAINLEVEL_OUTOF10: 5 - MODERATE PAIN
PAINLEVEL_OUTOF10: 0 - NO PAIN
PAINLEVEL_OUTOF10: 10 - WORST POSSIBLE PAIN

## 2024-02-12 NOTE — PROGRESS NOTES
Subjective   Patient ID [id number]   Christine Navarro is a 33 y.o.  at 35w1d with a working estimated date of delivery of 3/17/2024, by Last Menstrual Period who presents for a routine prenatal visit. She denies vaginal bleeding, leakage of fluid, decreased fetal movements, or contractions.    Her pregnancy is complicated by:  History of LTCS x 1  Hx of preeclampsia/GHTN  AMA  LGA fetus    Objective   Physical Exam:   Weight: 99.3 kg (219 lb)  Expected Total Weight Gain: 5 kg (11 lb)-9 kg (19 lb)   Pregravid BMI: 31.65  BP: (!) 158/100                  Prenatal Labs  Urine Dip:  Lab Results   Component Value Date    KETONESU NEGATIVE 2024     Lab Results   Component Value Date    HGB 12.5 2024    HCT 36.9 2024    ABO A 2023    HEPBSAG NONREACTIVE 2023           Imaging: Normal interval growth on recent US    Assessment/Plan   Problem List Items Addressed This Visit             ICD-10-CM    Gestational hypertension, third trimester O13.3     Other Visit Diagnoses         Codes    35 weeks gestation of pregnancy     Z3A.35    Relevant Orders    POCT UA Automated manually resulted (Completed)          Continue prenatal vitamin.  Labs reviewed.  GBS 36 weeks or if admitted for IOL  Expected mode of delivery TBD  Sent to SANTIAGO Morrison DO

## 2024-02-12 NOTE — PATIENT INSTRUCTIONS
You were seen in the office today for routine OB care and had abnormal findings on exam  Please go to L&D at [facility] to be evaluated  Continue taking prenatal vitamins   Avoid sick contacts and consider getting your Flu (available in office during flu season) and COVID vaccines to protect against infection in pregnancy  We recommend getting the Tdap (available in office) and RSV vaccines to pass some immunity from mother to baby and protect your baby against RSV and Whooping cough in the first few months of life. Tdap can be given between 27 and 36 weeks, and RSV vaccinations can be given between 32 and 36 weeks gestation  Make an appointment for routine care in the office in the next 2 weeks  If you are having any painful contractions, vaginal bleeding, leaking amniotic fluid, or decrease in baby's movements prior to your next visit please call the office to speak to the physician on call. This includes after hours, weekends, and holidays, when the answering service will be able to connect you with the physician on call. [phone] ([place] [facility]) or [phone] ([place] [facility]).

## 2024-02-12 NOTE — H&P
Obstetrical History and Physical     Chief Complaint: Hypertension    Assessment/Plan    Christine Navarro is a 33 y.o.  with preeclampsia with severe features.     - HELLP labs normal. P/c ratio increased to 0.28   - IV hypertensive treatment for her severe range blood pressures  - Start Magnesium Sulfate IV 6 gram bolus, then 2 grams per hour  - Betamethasone for fetal lung maturity  - Recommend transfer to [facility] for continued management. Spoke with Dr. Fernandez via the transfer center who accepted transfer    Active Problems:  Preeclampsia with severe features      Pregnancy Problems (from 23 to present)       Problem Noted Resolved    Gestational hypertension, third trimester 2024 by Leatha Capone MD No    Priority:  Medium      32 weeks gestation of pregnancy 2024 by Leatha Capone MD No    Priority:  Medium      26 weeks gestation of pregnancy 12/15/2023 by Leatha Capone MD No    Priority:  Medium            Subjective   Christine is here for evaluation of her blood pressure. She has a known diagnosis of GHTN. She was in the office today for her routine visit and found to have a severe range blood pressure. She was sent to L&D for further evaluation.  Blood pressures initially mild range, but then increased to severe.  She initially denied a headache, but then did develop mild head discomfort.  No nausea or vomiting.  No new abdominal pain.     Obstetrical History   OB History    Para Term  AB Living   5 4 4 0 0 4   SAB IAB Ectopic Multiple Live Births   0 0 0 0 4      # Outcome Date GA Lbr Thiago/2nd Weight Sex Delivery Anes PTL Lv   5 Current            4 Term 20 38w3d  3.402 kg F CS-LTranv Spinal N ALVA      Complications: Gestational hypertension   3 Term 16 38w2d  3.544 kg M Vag-Spont None N ALVA   2 Term 10/22/13 39w0d  3.515 kg F Vag-Spont None N ALVA   1 Term 12 39w0d  3.374 kg M Vag-Spont None N ALVA      Complications: Low-lying placenta, Preeclampsia        Past Medical History  Past Medical History:   Diagnosis Date    Encounter for routine postpartum follow-up 2021    Postpartum exam    Encounter for supervision of normal pregnancy, unspecified, second trimester 2016    Encounter for pregnancy related examination in second trimester    Gestational hypertension     History of pre-eclampsia     Seasonal allergies     Umbilical hernia     Unspecified abdominal hernia without obstruction or gangrene     Hernia    Varicella     Varicose veins during pregnancy         Past Surgical History   Past Surgical History:   Procedure Laterality Date     SECTION, LOW TRANSVERSE         Social History  Social History     Tobacco Use    Smoking status: Never    Smokeless tobacco: Never   Substance Use Topics    Alcohol use: Not Currently     Substance and Sexual Activity   Drug Use Never       Allergies  Patient has no known allergies.     Medications  Medications Prior to Admission   Medication Sig Dispense Refill Last Dose    b complex vitamins capsule Take by mouth.   Unknown    calcium carbonate (Oscal) 500 mg calcium (1,250 mg) tablet Take 1 tablet (1,250 mg) by mouth 2 times a day with meals.   2024    magnesium 30 mg tablet Take 1 tablet (30 mg) by mouth 2 times a day.   2024    omega-3 fatty acids (FISH OIL CONCENTRATE ORAL) Take by mouth.   2024    prenatal vit 49-iron fum-folic (Mini Prenatal) 6.75 mg iron- 200 mcg tablet Take by mouth.   2024       Objective    Last Vitals  Temp Pulse Resp BP MAP O2 Sat   36.6 °C (97.9 °F) 80 18 (!) 177/120   100 %     Physical Examination  Constitutional: Alert and in no acute distress.     Pulmonary: No respiratory distress.     Abdomen: Soft, nontender    Musculoskeletal: No joint swelling seen, normal movements of all extremities.    Skin: Normal skin color and pigmentation, normal skin turgor, and no rash.    Psychiatric: Alert and oriented x 3. Affect normal to patient's baseline. Mood:  Appropriate.        Lab Review  Labs in chart were reviewed.

## 2024-02-13 ENCOUNTER — ANESTHESIA EVENT (OUTPATIENT)
Dept: OBSTETRICS AND GYNECOLOGY | Facility: HOSPITAL | Age: 34
End: 2024-02-13
Payer: COMMERCIAL

## 2024-02-13 ENCOUNTER — ANESTHESIA (OUTPATIENT)
Dept: OBSTETRICS AND GYNECOLOGY | Facility: HOSPITAL | Age: 34
End: 2024-02-13
Payer: COMMERCIAL

## 2024-02-13 LAB
ABO GROUP (TYPE) IN BLOOD: NORMAL
ANTIBODY SCREEN: NORMAL
RH FACTOR (ANTIGEN D): NORMAL

## 2024-02-13 PROCEDURE — 2500000004 HC RX 250 GENERAL PHARMACY W/ HCPCS (ALT 636 FOR OP/ED): Performed by: NURSE ANESTHETIST, CERTIFIED REGISTERED

## 2024-02-13 PROCEDURE — 59514 CESAREAN DELIVERY ONLY: CPT

## 2024-02-13 PROCEDURE — 2720000007 HC OR 272 NO HCPCS: Performed by: OBSTETRICS & GYNECOLOGY

## 2024-02-13 PROCEDURE — 7100000016 HC LABOR RECOVERY PER HOUR: Performed by: OBSTETRICS & GYNECOLOGY

## 2024-02-13 PROCEDURE — 2500000002 HC RX 250 W HCPCS SELF ADMINISTERED DRUGS (ALT 637 FOR MEDICARE OP, ALT 636 FOR OP/ED)

## 2024-02-13 PROCEDURE — 2500000004 HC RX 250 GENERAL PHARMACY W/ HCPCS (ALT 636 FOR OP/ED): Performed by: STUDENT IN AN ORGANIZED HEALTH CARE EDUCATION/TRAINING PROGRAM

## 2024-02-13 PROCEDURE — 1100000001 HC PRIVATE ROOM DAILY

## 2024-02-13 PROCEDURE — 59514 CESAREAN DELIVERY ONLY: CPT | Performed by: OBSTETRICS & GYNECOLOGY

## 2024-02-13 PROCEDURE — 7100000016 HC LABOR RECOVERY PER HOUR

## 2024-02-13 PROCEDURE — 2500000005 HC RX 250 GENERAL PHARMACY W/O HCPCS: Performed by: NURSE ANESTHETIST, CERTIFIED REGISTERED

## 2024-02-13 PROCEDURE — 2500000001 HC RX 250 WO HCPCS SELF ADMINISTERED DRUGS (ALT 637 FOR MEDICARE OP)

## 2024-02-13 PROCEDURE — 88307 TISSUE EXAM BY PATHOLOGIST: CPT | Mod: TC,SUR

## 2024-02-13 PROCEDURE — 3700000014 HC AN EPIDURAL BLOCK CHARGE: Performed by: OBSTETRICS & GYNECOLOGY

## 2024-02-13 PROCEDURE — 88307 TISSUE EXAM BY PATHOLOGIST: CPT | Performed by: STUDENT IN AN ORGANIZED HEALTH CARE EDUCATION/TRAINING PROGRAM

## 2024-02-13 PROCEDURE — 99199 UNLISTED SPECIAL SVC PX/RPRT: CPT

## 2024-02-13 PROCEDURE — 2500000004 HC RX 250 GENERAL PHARMACY W/ HCPCS (ALT 636 FOR OP/ED)

## 2024-02-13 PROCEDURE — 2500000001 HC RX 250 WO HCPCS SELF ADMINISTERED DRUGS (ALT 637 FOR MEDICARE OP): Performed by: NURSE ANESTHETIST, CERTIFIED REGISTERED

## 2024-02-13 PROCEDURE — 59050 FETAL MONITOR W/REPORT: CPT

## 2024-02-13 PROCEDURE — 01961 ANES CESAREAN DELIVERY ONLY: CPT | Performed by: STUDENT IN AN ORGANIZED HEALTH CARE EDUCATION/TRAINING PROGRAM

## 2024-02-13 PROCEDURE — 01961 ANES CESAREAN DELIVERY ONLY: CPT | Performed by: NURSE ANESTHETIST, CERTIFIED REGISTERED

## 2024-02-13 PROCEDURE — 3700000018 HC OB ANESTHESIA C-SECTION: Performed by: OBSTETRICS & GYNECOLOGY

## 2024-02-13 RX ORDER — DIPHENHYDRAMINE HYDROCHLORIDE 50 MG/ML
25 INJECTION INTRAMUSCULAR; INTRAVENOUS EVERY 6 HOURS PRN
Status: DISCONTINUED | OUTPATIENT
Start: 2024-02-13 | End: 2024-02-18 | Stop reason: HOSPADM

## 2024-02-13 RX ORDER — CEFAZOLIN 1 G/1
INJECTION, POWDER, FOR SOLUTION INTRAVENOUS AS NEEDED
Status: DISCONTINUED | OUTPATIENT
Start: 2024-02-13 | End: 2024-02-13

## 2024-02-13 RX ORDER — OXYCODONE HYDROCHLORIDE 5 MG/1
5 TABLET ORAL EVERY 4 HOURS PRN
Status: DISCONTINUED | OUTPATIENT
Start: 2024-02-14 | End: 2024-02-18 | Stop reason: HOSPADM

## 2024-02-13 RX ORDER — FENTANYL CITRATE 50 UG/ML
INJECTION, SOLUTION INTRAMUSCULAR; INTRAVENOUS AS NEEDED
Status: DISCONTINUED | OUTPATIENT
Start: 2024-02-13 | End: 2024-02-13

## 2024-02-13 RX ORDER — METOCLOPRAMIDE HYDROCHLORIDE 5 MG/ML
10 INJECTION INTRAMUSCULAR; INTRAVENOUS EVERY 6 HOURS PRN
Status: DISCONTINUED | OUTPATIENT
Start: 2024-02-13 | End: 2024-02-18 | Stop reason: HOSPADM

## 2024-02-13 RX ORDER — LOPERAMIDE HYDROCHLORIDE 2 MG/1
4 CAPSULE ORAL EVERY 2 HOUR PRN
Status: DISCONTINUED | OUTPATIENT
Start: 2024-02-13 | End: 2024-02-18 | Stop reason: HOSPADM

## 2024-02-13 RX ORDER — ACETAMINOPHEN 120 MG/1
SUPPOSITORY RECTAL AS NEEDED
Status: DISCONTINUED | OUTPATIENT
Start: 2024-02-13 | End: 2024-02-13

## 2024-02-13 RX ORDER — KETOROLAC TROMETHAMINE 30 MG/ML
30 INJECTION, SOLUTION INTRAMUSCULAR; INTRAVENOUS EVERY 6 HOURS
Status: COMPLETED | OUTPATIENT
Start: 2024-02-13 | End: 2024-02-13

## 2024-02-13 RX ORDER — HYDROMORPHONE HYDROCHLORIDE 1 MG/ML
0.2 INJECTION, SOLUTION INTRAMUSCULAR; INTRAVENOUS; SUBCUTANEOUS EVERY 5 MIN PRN
Status: DISCONTINUED | OUTPATIENT
Start: 2024-02-13 | End: 2024-02-18 | Stop reason: HOSPADM

## 2024-02-13 RX ORDER — ACETAMINOPHEN 325 MG/1
975 TABLET ORAL EVERY 6 HOURS
Status: DISCONTINUED | OUTPATIENT
Start: 2024-02-13 | End: 2024-02-18 | Stop reason: HOSPADM

## 2024-02-13 RX ORDER — KETOROLAC TROMETHAMINE 30 MG/ML
INJECTION, SOLUTION INTRAMUSCULAR; INTRAVENOUS AS NEEDED
Status: DISCONTINUED | OUTPATIENT
Start: 2024-02-13 | End: 2024-02-13

## 2024-02-13 RX ORDER — BUTORPHANOL TARTRATE 1 MG/ML
1 INJECTION INTRAMUSCULAR; INTRAVENOUS
Status: DISCONTINUED | OUTPATIENT
Start: 2024-02-13 | End: 2024-02-18 | Stop reason: HOSPADM

## 2024-02-13 RX ORDER — LIDOCAINE 560 MG/1
1 PATCH PERCUTANEOUS; TOPICAL; TRANSDERMAL
Status: DISCONTINUED | OUTPATIENT
Start: 2024-02-13 | End: 2024-02-18 | Stop reason: HOSPADM

## 2024-02-13 RX ORDER — NIFEDIPINE 10 MG/1
10 CAPSULE ORAL ONCE AS NEEDED
Status: DISCONTINUED | OUTPATIENT
Start: 2024-02-13 | End: 2024-02-18 | Stop reason: HOSPADM

## 2024-02-13 RX ORDER — POLYETHYLENE GLYCOL 3350 17 G/17G
17 POWDER, FOR SOLUTION ORAL 2 TIMES DAILY PRN
Status: DISCONTINUED | OUTPATIENT
Start: 2024-02-13 | End: 2024-02-18 | Stop reason: HOSPADM

## 2024-02-13 RX ORDER — CARBOPROST TROMETHAMINE 250 UG/ML
250 INJECTION, SOLUTION INTRAMUSCULAR ONCE AS NEEDED
Status: DISCONTINUED | OUTPATIENT
Start: 2024-02-13 | End: 2024-02-18 | Stop reason: HOSPADM

## 2024-02-13 RX ORDER — ADHESIVE BANDAGE
10 BANDAGE TOPICAL
Status: DISCONTINUED | OUTPATIENT
Start: 2024-02-13 | End: 2024-02-18 | Stop reason: HOSPADM

## 2024-02-13 RX ORDER — DIPHENHYDRAMINE HYDROCHLORIDE 50 MG/ML
25 INJECTION INTRAMUSCULAR; INTRAVENOUS EVERY 4 HOURS PRN
Status: DISCONTINUED | OUTPATIENT
Start: 2024-02-13 | End: 2024-02-18 | Stop reason: HOSPADM

## 2024-02-13 RX ORDER — NALOXONE HYDROCHLORIDE 0.4 MG/ML
0.1 INJECTION, SOLUTION INTRAMUSCULAR; INTRAVENOUS; SUBCUTANEOUS EVERY 5 MIN PRN
Status: DISCONTINUED | OUTPATIENT
Start: 2024-02-13 | End: 2024-02-18 | Stop reason: HOSPADM

## 2024-02-13 RX ORDER — MORPHINE SULFATE 1 MG/ML
INJECTION, SOLUTION EPIDURAL; INTRATHECAL; INTRAVENOUS AS NEEDED
Status: DISCONTINUED | OUTPATIENT
Start: 2024-02-13 | End: 2024-02-13

## 2024-02-13 RX ORDER — FAMOTIDINE 10 MG/ML
INJECTION INTRAVENOUS AS NEEDED
Status: DISCONTINUED | OUTPATIENT
Start: 2024-02-13 | End: 2024-02-13

## 2024-02-13 RX ORDER — IBUPROFEN 600 MG/1
600 TABLET ORAL EVERY 6 HOURS
Status: DISCONTINUED | OUTPATIENT
Start: 2024-02-14 | End: 2024-02-18 | Stop reason: HOSPADM

## 2024-02-13 RX ORDER — ONDANSETRON 4 MG/1
4 TABLET, FILM COATED ORAL EVERY 6 HOURS PRN
Status: DISCONTINUED | OUTPATIENT
Start: 2024-02-13 | End: 2024-02-18 | Stop reason: HOSPADM

## 2024-02-13 RX ORDER — PHENYLEPHRINE HCL IN 0.9% NACL 0.4MG/10ML
SYRINGE (ML) INTRAVENOUS AS NEEDED
Status: DISCONTINUED | OUTPATIENT
Start: 2024-02-13 | End: 2024-02-13

## 2024-02-13 RX ORDER — PHENYLEPHRINE 10 MG/250 ML(40 MCG/ML)IN 0.9 % SOD.CHLORIDE INTRAVENOUS
CONTINUOUS PRN
Status: DISCONTINUED | OUTPATIENT
Start: 2024-02-13 | End: 2024-02-13

## 2024-02-13 RX ORDER — ENOXAPARIN SODIUM 100 MG/ML
40 INJECTION SUBCUTANEOUS EVERY 24 HOURS
Status: DISCONTINUED | OUTPATIENT
Start: 2024-02-14 | End: 2024-02-18 | Stop reason: HOSPADM

## 2024-02-13 RX ORDER — HYDRALAZINE HYDROCHLORIDE 20 MG/ML
5 INJECTION INTRAMUSCULAR; INTRAVENOUS ONCE AS NEEDED
Status: DISCONTINUED | OUTPATIENT
Start: 2024-02-13 | End: 2024-02-18 | Stop reason: HOSPADM

## 2024-02-13 RX ORDER — ONDANSETRON HYDROCHLORIDE 2 MG/ML
4 INJECTION, SOLUTION INTRAVENOUS EVERY 6 HOURS PRN
Status: DISCONTINUED | OUTPATIENT
Start: 2024-02-13 | End: 2024-02-18 | Stop reason: HOSPADM

## 2024-02-13 RX ORDER — HYDROMORPHONE HYDROCHLORIDE 1 MG/ML
0.2 INJECTION, SOLUTION INTRAMUSCULAR; INTRAVENOUS; SUBCUTANEOUS EVERY 5 MIN PRN
Status: DISCONTINUED | OUTPATIENT
Start: 2024-02-13 | End: 2024-02-13 | Stop reason: HOSPADM

## 2024-02-13 RX ORDER — BUPIVACAINE HYDROCHLORIDE 7.5 MG/ML
INJECTION, SOLUTION INTRASPINAL AS NEEDED
Status: DISCONTINUED | OUTPATIENT
Start: 2024-02-13 | End: 2024-02-13

## 2024-02-13 RX ORDER — GLYCOPYRROLATE 0.2 MG/ML
INJECTION INTRAMUSCULAR; INTRAVENOUS AS NEEDED
Status: DISCONTINUED | OUTPATIENT
Start: 2024-02-13 | End: 2024-02-13

## 2024-02-13 RX ORDER — DIPHENHYDRAMINE HCL 25 MG
25 CAPSULE ORAL EVERY 4 HOURS PRN
Status: DISCONTINUED | OUTPATIENT
Start: 2024-02-13 | End: 2024-02-18 | Stop reason: HOSPADM

## 2024-02-13 RX ORDER — OXYCODONE HYDROCHLORIDE 5 MG/1
10 TABLET ORAL EVERY 4 HOURS PRN
Status: DISCONTINUED | OUTPATIENT
Start: 2024-02-14 | End: 2024-02-18 | Stop reason: HOSPADM

## 2024-02-13 RX ORDER — BISACODYL 10 MG/1
10 SUPPOSITORY RECTAL DAILY PRN
Status: DISCONTINUED | OUTPATIENT
Start: 2024-02-13 | End: 2024-02-18 | Stop reason: HOSPADM

## 2024-02-13 RX ORDER — DEXAMETHASONE SODIUM PHOSPHATE 4 MG/ML
INJECTION, SOLUTION INTRA-ARTICULAR; INTRALESIONAL; INTRAMUSCULAR; INTRAVENOUS; SOFT TISSUE AS NEEDED
Status: DISCONTINUED | OUTPATIENT
Start: 2024-02-13 | End: 2024-02-13

## 2024-02-13 RX ORDER — LABETALOL HYDROCHLORIDE 5 MG/ML
20 INJECTION, SOLUTION INTRAVENOUS ONCE AS NEEDED
Status: DISCONTINUED | OUTPATIENT
Start: 2024-02-13 | End: 2024-02-18 | Stop reason: HOSPADM

## 2024-02-13 RX ORDER — SIMETHICONE 80 MG
80 TABLET,CHEWABLE ORAL 4 TIMES DAILY PRN
Status: DISCONTINUED | OUTPATIENT
Start: 2024-02-13 | End: 2024-02-18 | Stop reason: HOSPADM

## 2024-02-13 RX ADMIN — Medication 0.49 MCG/KG/MIN: at 04:26

## 2024-02-13 RX ADMIN — BUPIVACAINE HYDROCHLORIDE IN DEXTROSE 2 ML: 7.5 INJECTION, SOLUTION SUBARACHNOID at 04:26

## 2024-02-13 RX ADMIN — KETOROLAC TROMETHAMINE 30 MG: 30 INJECTION, SOLUTION INTRAMUSCULAR; INTRAVENOUS at 17:31

## 2024-02-13 RX ADMIN — METOCLOPRAMIDE 10 MG: 10 TABLET ORAL at 00:05

## 2024-02-13 RX ADMIN — SODIUM CHLORIDE, SODIUM LACTATE, POTASSIUM CHLORIDE, AND CALCIUM CHLORIDE: 600; 310; 30; 20 INJECTION, SOLUTION INTRAVENOUS at 05:30

## 2024-02-13 RX ADMIN — MAGNESIUM SULFATE HEPTAHYDRATE 2 G/HR: 40 INJECTION, SOLUTION INTRAVENOUS at 20:33

## 2024-02-13 RX ADMIN — DIPHENHYDRAMINE HYDROCHLORIDE 25 MG: 50 INJECTION INTRAMUSCULAR; INTRAVENOUS at 23:03

## 2024-02-13 RX ADMIN — ACETAMINOPHEN 975 MG: 325 TABLET ORAL at 17:31

## 2024-02-13 RX ADMIN — Medication 120 MCG: at 04:32

## 2024-02-13 RX ADMIN — KETOROLAC TROMETHAMINE 30 MG: 30 INJECTION, SOLUTION INTRAMUSCULAR at 05:15

## 2024-02-13 RX ADMIN — ONDANSETRON 4 MG: 2 INJECTION, SOLUTION INTRAMUSCULAR; INTRAVENOUS at 04:17

## 2024-02-13 RX ADMIN — METOCLOPRAMIDE 10 MG: 5 INJECTION, SOLUTION INTRAMUSCULAR; INTRAVENOUS at 04:30

## 2024-02-13 RX ADMIN — DEXAMETHASONE SODIUM PHOSPHATE 4 MG: 4 INJECTION INTRA-ARTICULAR; INTRALESIONAL; INTRAMUSCULAR; INTRAVENOUS; SOFT TISSUE at 05:06

## 2024-02-13 RX ADMIN — FENTANYL CITRATE 10 MCG: 50 INJECTION, SOLUTION INTRAMUSCULAR; INTRAVENOUS at 04:26

## 2024-02-13 RX ADMIN — Medication 120 MCG: at 04:37

## 2024-02-13 RX ADMIN — SODIUM CHLORIDE, SODIUM LACTATE, POTASSIUM CHLORIDE, AND CALCIUM CHLORIDE: 600; 310; 30; 20 INJECTION, SOLUTION INTRAVENOUS at 04:15

## 2024-02-13 RX ADMIN — SODIUM CHLORIDE, SODIUM LACTATE, POTASSIUM CHLORIDE, AND CALCIUM CHLORIDE: 600; 310; 30; 20 INJECTION, SOLUTION INTRAVENOUS at 04:36

## 2024-02-13 RX ADMIN — ACETAMINOPHEN 650 MG: 120 SUPPOSITORY RECTAL at 05:33

## 2024-02-13 RX ADMIN — FAMOTIDINE 20 MG: 10 INJECTION, SOLUTION INTRAVENOUS at 04:30

## 2024-02-13 RX ADMIN — METOCLOPRAMIDE 10 MG: 5 INJECTION, SOLUTION INTRAMUSCULAR; INTRAVENOUS at 23:00

## 2024-02-13 RX ADMIN — MAGNESIUM SULFATE HEPTAHYDRATE 2 G/HR: 40 INJECTION, SOLUTION INTRAVENOUS at 00:40

## 2024-02-13 RX ADMIN — KETOROLAC TROMETHAMINE 30 MG: 30 INJECTION, SOLUTION INTRAMUSCULAR; INTRAVENOUS at 11:21

## 2024-02-13 RX ADMIN — Medication 160 MCG: at 04:36

## 2024-02-13 RX ADMIN — ACETAMINOPHEN 975 MG: 325 TABLET ORAL at 22:59

## 2024-02-13 RX ADMIN — NIFEDIPINE 30 MG: 30 TABLET, FILM COATED, EXTENDED RELEASE ORAL at 21:39

## 2024-02-13 RX ADMIN — GLYCOPYRROLATE 0.2 MG: 0.2 INJECTION, SOLUTION INTRAMUSCULAR; INTRAVENOUS at 04:34

## 2024-02-13 RX ADMIN — CEFAZOLIN 2 G: 330 INJECTION, POWDER, FOR SOLUTION INTRAMUSCULAR; INTRAVENOUS at 04:30

## 2024-02-13 RX ADMIN — SODIUM CHLORIDE, POTASSIUM CHLORIDE, SODIUM LACTATE AND CALCIUM CHLORIDE 75 ML/HR: 600; 310; 30; 20 INJECTION, SOLUTION INTRAVENOUS at 19:30

## 2024-02-13 RX ADMIN — KETOROLAC TROMETHAMINE 30 MG: 30 INJECTION, SOLUTION INTRAMUSCULAR; INTRAVENOUS at 23:02

## 2024-02-13 RX ADMIN — SODIUM CHLORIDE 600 MILLI-UNITS/MIN: 9 INJECTION, SOLUTION INTRAVENOUS at 04:56

## 2024-02-13 RX ADMIN — MORPHINE SULFATE 0.15 MG: 1 INJECTION, SOLUTION EPIDURAL; INTRATHECAL; INTRAVENOUS at 04:26

## 2024-02-13 RX ADMIN — MAGNESIUM SULFATE HEPTAHYDRATE 2 G/HR: 40 INJECTION, SOLUTION INTRAVENOUS at 10:47

## 2024-02-13 RX ADMIN — ACETAMINOPHEN 975 MG: 325 TABLET ORAL at 11:21

## 2024-02-13 RX ADMIN — DIPHENHYDRAMINE HYDROCHLORIDE 25 MG: 25 CAPSULE ORAL at 00:05

## 2024-02-13 ASSESSMENT — PAIN SCALES - GENERAL
PAINLEVEL_OUTOF10: 0 - NO PAIN
PAINLEVEL_OUTOF10: 0 - NO PAIN
PAINLEVEL_OUTOF10: 10 - WORST POSSIBLE PAIN
PAINLEVEL_OUTOF10: 5 - MODERATE PAIN
PAINLEVEL_OUTOF10: 0 - NO PAIN
PAINLEVEL_OUTOF10: 0 - NO PAIN
PAINLEVEL_OUTOF10: 5 - MODERATE PAIN
PAINLEVEL_OUTOF10: 4
PAINLEVEL_OUTOF10: 0 - NO PAIN
PAINLEVEL_OUTOF10: 0 - NO PAIN
PAINLEVEL_OUTOF10: 5 - MODERATE PAIN
PAINLEVEL_OUTOF10: 4
PAINLEVEL_OUTOF10: 0 - NO PAIN
PAINLEVEL_OUTOF10: 0 - NO PAIN
PAIN_LEVEL: 0
PAINLEVEL_OUTOF10: 7
PAINLEVEL_OUTOF10: 0 - NO PAIN
PAINLEVEL_OUTOF10: 3
PAINLEVEL_OUTOF10: 1
PAINLEVEL_OUTOF10: 0 - NO PAIN
PAINLEVEL_OUTOF10: 2
PAINLEVEL_OUTOF10: 6

## 2024-02-13 ASSESSMENT — PAIN DESCRIPTION - DESCRIPTORS
DESCRIPTORS: ACHING;CRAMPING
DESCRIPTORS: ACHING
DESCRIPTORS: CRAMPING;PRESSURE
DESCRIPTORS: ACHING

## 2024-02-13 ASSESSMENT — PAIN - FUNCTIONAL ASSESSMENT: PAIN_FUNCTIONAL_ASSESSMENT: 0-10

## 2024-02-13 ASSESSMENT — ACTIVITIES OF DAILY LIVING (ADL): LACK_OF_TRANSPORTATION: NO

## 2024-02-13 ASSESSMENT — PAIN DESCRIPTION - LOCATION: LOCATION: HEAD

## 2024-02-13 NOTE — ANESTHESIA POSTPROCEDURE EVALUATION
Patient: Christine Navarro    Procedure Summary       Date: 24 Room / Location: [facility] OB    Anesthesia Start: 415 Anesthesia Stop: 544    Procedure: OBGYN Delivery  Section Diagnosis: (Other (Add Comments))    Surgeons: Leeann Rodriguez MD Responsible Provider: Lala Crisostomo MD    Anesthesia Type: CSE ASA Status: 3            Anesthesia Type: CSE    Vitals Value Taken Time   BP 96/53 24 0546   Temp 36.4 24 0550   Pulse 77 24 0546   Resp 20 24 0550   SpO2 96 % 24 0546       Anesthesia Post Evaluation    Patient location during evaluation: bedside  Patient participation: complete - patient participated  Level of consciousness: awake and alert  Pain score: 0  Pain management: adequate  Airway patency: patent  Cardiovascular status: acceptable  Respiratory status: acceptable  Hydration status: acceptable  Postoperative Nausea and Vomiting: none        There were no known notable events for this encounter.

## 2024-02-13 NOTE — PROGRESS NOTES
Postpartum Progress Note    Assessment/Plan   Christine Navarro is a 33 y.o., , who delivered at 35w2d gestation and is now postpartum day 0 s/p rLTCS. Has sPEC now on 24 hrs of ppMg    s/p rLTCS POD#0  -Pain well controlled on current regimen --> transition to PO pain meds POD#1  -Afebrile  -Tolerating regular diet with anti-emetics PRN and bowel regimen ordered  -UOP is adequate -->d/c damon after Mg completion--> follow up void  -Admission hgb 12.9--> --> POD#1 hgb; Continue to monitor for si/sx of anemia.   -Continue routine postoperative care     sPEC  - gHTN -> sPEC by severes requiring IV tx  - Nifed 30 (2)  - Mg til 0500 2/14  - HELLP labs wnl x2    Feeding - breast/formula  -Continue to encourage breast feeding  -Lactation consult as needed    DVT prophylaxis  -VTE risk score = 8, lovenox ppx  -SCDs  -Ambulation    Dispo: anticipate discharge on POD#3 if continues to meet milestones. Plan for follow up in 1 week for BP check, 2 wks for incision check and in 4-6 weeks for PP visit.    Seen and discussed with Dr. Sin Cano MD PGY-1        Principal Problem:    Preeclampsia, third trimester  Active Problems:    Gastroesophageal reflux disease    Pregnancy Problems (from 23 to present)       Problem Noted Resolved    Preeclampsia, severe, third trimester 2024 by Renee Hill MD No    Priority:  Medium      Preeclampsia, third trimester 2024 by Christine Etienne MD No    Priority:  Medium      Gestational hypertension, third trimester 2024 by Leatha Capone MD No    Priority:  Medium      32 weeks gestation of pregnancy 2024 by Leatha Capone MD No    Priority:  Medium      26 weeks gestation of pregnancy 12/15/2023 by Leatha Capone MD No    Priority:  Medium            Hospital course: postpartum preeclampsia/eclampsia   section delivery  Patient is currently breastfeedingThe patient's blood type is A POS.    Subjective   Her pain is well controlled  with current medications  She is tolerating a Adult diet Regular  She reports no breast or nursing problems  She denies emotional concerns today        Patient does not report headaches, visual changes, chest pain, shortness of breath or RUQ pain      Objective   Allergies:   Patient has no known allergies.         Last Vitals:  Temp Pulse Resp BP MAP Pulse Ox   36.3 °C (97.3 °F) 84 20 120/70   96 %     Vitals Min/Max Last 24 Hours:  Temp  Min: 36.3 °C (97.3 °F)  Max: 36.7 °C (98.1 °F)  Pulse  Min: 71  Max: 112  Resp  Min: 16  Max: 20  BP  Min: 96/52  Max: 177/120    Intake/Output:     Intake/Output Summary (Last 24 hours) at 2/13/2024 1200  Last data filed at 2/13/2024 1031  Gross per 24 hour   Intake 4019.01 ml   Output 3460 ml   Net 559.01 ml       Physical Exam:  General: Examination reveals a well developed, well nourished, female, in no acute distress. She is alert and cooperative.  Lungs: clear to auscultation bilaterally.  Cardiac: regular rate and rhythm, S1, S2 normal, no murmur, click, rub or gallop.  Abdomen: soft, non tender  Incision: pfannenstiel incision covered by dressing. C/d/i.  Fundus: firm and below umbilicus.  Neurological: DTRs normal and symmetrical.  Psychological: awake and alert; oriented to person, place, and time.    Lab Data:  Lab Results   Component Value Date    WBC 11.3 02/12/2024    HGB 12.9 02/12/2024    HCT 37.6 02/12/2024     02/12/2024     Lab Results   Component Value Date    GLUCOSE 122 (H) 02/12/2024     (L) 02/12/2024    K 3.9 02/12/2024     02/12/2024    CO2 21 02/12/2024    ANIONGAP 15 02/12/2024    BUN 7 02/12/2024    CREATININE 0.47 (L) 02/12/2024    EGFR >90 02/12/2024    CALCIUM 8.7 02/12/2024    ALBUMIN 3.8 02/12/2024    PROT 6.5 02/12/2024    ALKPHOS 93 02/12/2024    ALT 12 02/12/2024    AST 17 02/12/2024    BILITOT 0.5 02/12/2024

## 2024-02-13 NOTE — PROGRESS NOTES
Postpartum Progress Note    Assessment/Plan   Christine Navarro is a 33 y.o., , who delivered at 35w2d gestation and is now postpartum day 0 s/p rLTCS. Has sPEC now on 24 hrs of ppMg     s/p rLTCS POD#0  -Pain well controlled on current regimen --> transition to PO pain meds POD#1  -Afebrile  -Tolerating regular diet with anti-emetics PRN and bowel regimen ordered  -UOP is adequate -->d/c damon after Mg completion--> follow up void  -Admission hgb 12.9--> --> POD#1 hgb pending; Continue to monitor for si/sx of anemia.   -Continue routine postoperative care   -Adequate UOP     sPEC  - gHTN -> sPEC by severes requiring IV tx  - Nifed 30 (2/)  - Mg til 0500 2/14. No current s/s of toxicity.  - Adequate UOP  - HELLP labs wnl x2     Feeding - breast/formula  -Continue to encourage breast feeding  -Lactation consult as needed     DVT prophylaxis  -VTE risk score = 8, lovenox ppx  -SCDs  -Ambulation     Dispo: anticipate discharge on POD#3 if continues to meet milestones. Plan for follow up in 1 week for BP check, 2 wks for incision check and in 4-6 weeks for PP visit.    Pt seen and d/w Dr. Zay Etienne MD PGY-2      Principal Problem:    Preeclampsia, third trimester  Active Problems:    Gastroesophageal reflux disease      Subjective   Patient resting comfortably in bed. Denies HA, scotoma, RUQ pain, SOB, chest pain. Denies abdominal pain.      Objective   Allergies:   Patient has no known allergies.         Last Vitals:  Temp Pulse Resp BP MAP Pulse Ox   36.4 °C (97.5 °F) 89 18 132/71   96 %     Vitals Min/Max Last 24 Hours:  Temp  Min: 36.3 °C (97.3 °F)  Max: 36.7 °C (98.1 °F)  Pulse  Min: 71  Max: 112  Resp  Min: 16  Max: 20  BP  Min: 96/52  Max: 175/90    Intake/Output:     Intake/Output Summary (Last 24 hours) at 2024 1849  Last data filed at 2024 1731  Gross per 24 hour   Intake 4319.01 ml   Output 6210 ml   Net -1890.99 ml       Physical Exam:  Constitutional: No visible  "distress, alert and cooperative  Respiratory/Thorax: Normal respiratory effort on RA. Lungs CTAB.  Cardiovascular: RRR  Gastrointestinal: soft, nondistended, nontender, incision with dressing in place, clean and dry  Neurological: 2+ DTR in bilat UE  Psychological: Appropriate mood and behavior      Lab Data:  Lab Results   Component Value Date    WBC 11.3 02/12/2024    HGB 12.9 02/12/2024    HCT 37.6 02/12/2024     02/12/2024     Lab Results   Component Value Date    GLUCOSE 122 (H) 02/12/2024     (L) 02/12/2024    K 3.9 02/12/2024     02/12/2024    CO2 21 02/12/2024    ANIONGAP 15 02/12/2024    BUN 7 02/12/2024    CREATININE 0.47 (L) 02/12/2024    EGFR >90 02/12/2024    CALCIUM 8.7 02/12/2024    ALBUMIN 3.8 02/12/2024    PROT 6.5 02/12/2024    ALKPHOS 93 02/12/2024    ALT 12 02/12/2024    AST 17 02/12/2024    BILITOT 0.5 02/12/2024     No results found for: \"UTPCR\"  "

## 2024-02-13 NOTE — CARE PLAN
The patient's goals for the shift include healthy delivery    The clinical goals for the shift include maintain BP <160/110

## 2024-02-13 NOTE — ANESTHESIA PREPROCEDURE EVALUATION
Patient: Christine Navarro    Evaluation Method: In-person visit    Procedure Information    Date: 24  Procedure: Labor Consult         Relevant Problems   Anesthesia (within normal limits)      Cardiovascular   (+) Preeclampsia, severe, third trimester   (+) Preeclampsia, third trimester      Endocrine (within normal limits)      GI   (+) Gastroesophageal reflux disease (Pregnancy related, well controlled with Tums)      /Renal (within normal limits)      Neuro/Psych (within normal limits)      Pulmonary (within normal limits)      GI/Hepatic (within normal limits)      Hematology (within normal limits)      Musculoskeletal (within normal limits)       Clinical information reviewed:   Tobacco  Allergies  Meds   Med Hx  Surg Hx   Fam Hx          NPO Detail:  No data recorded     OB/Gyn Evaluation    Present Pregnancy    (+) , previous  section (G1-3 NCB x 3, G4 CS for breech) - primary, hypertensive disorder of pregnancy - preeclampsia   Obstetric History                Physical Exam    Airway  Mallampati: I  TM distance: >3 FB  Neck ROM: full     Cardiovascular    Dental    Pulmonary    Abdominal            Anesthesia Plan    History of general anesthesia?: no  History of complications of general anesthesia?: no    ASA 3     epidural   (Epidural and GA R/B discussed.  Questions welcomed.  Pt agrees with plan.)  Anesthetic plan and risks discussed with patient.  Use of blood products discussed with patient who consented to blood products.    Plan discussed with CAA and attending.

## 2024-02-13 NOTE — DISCHARGE SUMMARY
Discharge/Transfer Summary    Admission Date: 2/12/2024  Transfer/discharge Date: 2/12/24    Discharge Diagnosis  Preeclampsia, severe, third trimester    Hospital Course    The patient presented from the office with a severe range blood pressure. She was observed on L&D. Severe range blood pressure returned, patient developed a headache. Preeclampsia with severe features diagnosed. She was started on Magnesium Sulfate for seizure prophylaxis. She was given Betamethasone for fetal lung maturity. She received one dose of Labetalol for hypertension. Spoke with Dr. Fernandez. [place] [facility] [place] accepted.    Discharge Meds     Your medication list        STOP taking these medications      b complex vitamins capsule        calcium carbonate 500 mg calcium (1,250 mg) tablet  Commonly known as: Oscal        FISH OIL CONCENTRATE ORAL        magnesium 30 mg tablet        Mini Prenatal 6.75 mg iron- 200 mcg tablet  Generic drug: prenatal vit 49-iron fum-folic                  Test Results Pending At Discharge  Pending Labs       No current pending labs.            Outpatient Follow-Up  Future Appointments   Date Time Provider Department Center   2/15/2024 10:15 AM [id number] OBGYNIMG ULTRASOUND 3 [id number] VICKI Rodrigues P   2/19/2024 12:30 PM Viridiana Morrison DO [id number]   2/22/2024  8:30 AM Renee Hill MD [id number]         Renee Hill MD

## 2024-02-13 NOTE — L&D DELIVERY NOTE
OB Delivery Note  2024  Christine Navarro  33 y.o.   , Low Transverse      Date: 2024  OR Location: [facility] OB    Name: Christine Navarro, : 1990, Age: 33 y.o., MRN: [id number], Sex: female    Diagnosis  * No Diagnosis Codes entered * * No Diagnosis Codes entered *   IUP @ 35w2d   sPEC  Hx of CS    Procedures    * OBGYN Delivery  Section    Surgeons      * Leeann Rodriguez - Primary    Resident/Fellow/Other Assistant:  Surgeon(s) and Role:  Christine Etienne MD PGY-2 (Resident)    Procedure Summary  Anesthesia: Spinal       ASA: III  Anesthesia Staff: Anesthesiologist: aLla Crisostomo MD  CRNA: MARIAN Loredo-CRNA  Estimated Blood Loss: 500mL  Intra-op Medications: * Intraprocedure medication information is unavailable because the case start and end events have not been set *           Anesthesia Record               Intraprocedure I/O Totals          Intake    Magnesium Sulfate 0.00 mL    The total shown is the total volume documented since Anesthesia Start was filed.    lactated Ringer's bolus 500 mL 1300.00 mL    Oxytocin Drip 0.00 mL    The total shown is the total volume documented since Anesthesia Start was filed.    Phenylephrine Drip 0.00 mL    The total shown is the total volume documented since Anesthesia Start was filed.    Total Intake 1300 mL       Output    Urine 300 mL    Total Blood Loss - Surgical Delivery (mL) 500 mL    Total Output 800 mL       Net    Net Volume 500 mL          Specimen: No specimens collected     Staff:   Circulator: Cordelia Mcnair RN  Scrub Person: Davey Zafar         Informed Consent:  The risks, benefits, complications, and alternatives were discussed with the patient. The patient understood that the risks of  section include, but are not limited to: injury to nearby structures or organs, infection, blood loss and possible need for transfusion, and potential need for more surgery including hysterectomy. The patient stated understanding  and desired to proceed. All questions were answered. The site of surgery was properly noted and marked. The patient was identified as Christine Navarro and the procedure verified as a  delivery. A Time Out was held and the above information confirmed.    Procedure Details:  Decision for CS: Desires rCS, for delivery due to sPEC     Findings: Normal appearing gravid uterus, fallopian tubes, and ovaries. Amniotic fluid clear,  female infant in cephalic presentation. Mild adhesive disease at the level of the fascia and peritoneum.     Procedure: Pt was taken to the OR where spinal anesthesia was administered.  She was then placed in the dorsal supine position with a left lateral tilt. A damon catheter was placed, SCD's were applied, and a vaginal prep was performed. A pre-procedure time out was performed.  The pt was given prophylactic dose of IV antibiotics.  She was then prepped and draped in the usual sterile fashion. A Pfannenstiel skin incision was made with the scalpel through the skin and subcutaneous fat to the underlying fascial layer.  The fascia was incised in the midline with the scalpel and the incision was extended bilaterally. The superior aspect of the incision was grasped, tented up with Kocher clamps and the rectus muscle was dissected off. The muscles were divided in the midline, the peritoneum was then identified. It could not be entered bluntly due to adhesive disease; it was grasped with hemostats and entered sharply with mets. The incision extended superiorly and inferiorly with good visualization of bladder below.        A low transverse uterine incision was made with the scalpel, the uterine cavity was entered, and the hysterotomy was extended cephalocaudally by stretching.  The infant was delivered atraumatically, the cord was clamped and cut and infant was handed off to awaiting nursing.  A cord segment and blood sample were collected.  The placenta was then expressed.  The uterus was  cleared of all clot and debris. The uterine incision was repaired using a running stitch of 0-Vicryl. A figure of eight stitch of the same suture was added to the hysterotomy for hemostasis.. Good hemostasis was noted.     The hysterotomy was again evaluated and found to be hemostatic, the underside of the fascia and bladder and the rectus muscles were also found to be hemostatic.  The fascia was closed using a running stitch of 0-PDS.  The subcutaneous layer was irrigated, small bleeders were cauterized, and the subcutaneous layer was reapproximated using a running stitch of 2-0 monocryl. The skin was closed with 4-0 Vicryl on a curved needle. Steristrips were added to the skin closure. All counts were correct, the patient tolerated the procedure well.  Dr. Rodriguez was present for the entire procedure.  The patient was taken back to the recovery room in stable condition. The infant was taken to the NICU.      Complications:  None; patient tolerated the procedure well.     Disposition:  L&D room  Condition: stable    Gestational Age: 35w2d  /Para:   Quantitative Blood Loss: Admission to Discharge: 500 mL (2024  8:13 PM - 2024  7:14 AM)    Michael Navarro [[id number]]      Labor Events    Rupture date/time: 2024  Rupture type: Artificial  Fluid color: Clear  Fluid odor: None  Labor type:  Without Labor  Labor allowed to proceed with plans for an attempted vaginal birth?: No  Complications: None       Placenta    Placenta delivery date/time: 2024  Placenta removal: Spontaneous  Placenta appearance: Intact  Placenta disposition: pathology       Cord    Vessels: 3 vessels  Complications: None  Delayed cord clamping?: Yes  Cord clamped date/time: 2024  Cord blood disposition: Discarded  Gases sent?: No  Cord comments: sent with peds when baby taken to NICU  Stem cell collection (by provider): No       Anesthesia    Method: Spinal       Operative Delivery     Forceps attempted?: No  Vacuum extractor attempted?: No       Shoulder Dystocia    Shoulder dystocia present?: No        Delivery    Birth date/time: 2024 04:56:00  Delivery type: , Low Transverse   categorization: repeat   priority: routine  Indications for : Repeat   Incision type: low transverse  Complications: None       Resuscitation    Method: Suctioning, Tactile stimulation       Apgars    Living status: Living  Apgar Component Scores:  1 min.:  5 min.:  10 min.:  15 min.:  20 min.:    Skin color:  0  0       Heart rate:  2  2       Reflex irritability:  2  2       Muscle tone:  2  2       Respiratory effort:  2  2       Total:  8  8       Apgars assigned by: JACQUELYN CHUNG RN       Delivery Providers    Delivering clinician: Leeann Rodriguez MD   Provider Role    Cordelia Mcnair, RN Delivery Nurse    Mary Chung, RN Nursery Nurse    Chirstine Etienne MD Resident                 Christine Etienne MD

## 2024-02-13 NOTE — ANESTHESIA PREPROCEDURE EVALUATION
Patient: Christine Navarro    Evaluation Method: In-person visit    Procedure Information    Date: 24  Procedure: Labor Consult         Relevant Problems   Anesthesia (within normal limits)      Cardiovascular   (+) Preeclampsia, severe, third trimester   (+) Preeclampsia, third trimester      Endocrine (within normal limits)      GI   (+) Gastroesophageal reflux disease (Pregnancy related, well controlled with Tums)      /Renal (within normal limits)      Neuro/Psych (within normal limits)      Pulmonary (within normal limits)      GI/Hepatic (within normal limits)      Hematology (within normal limits)      Musculoskeletal (within normal limits)       Clinical information reviewed:   Tobacco  Allergies  Meds   Med Hx  Surg Hx   Fam Hx          NPO Detail:  No data recorded     OB/Gyn Evaluation    Present Pregnancy    (+) , previous  section (G1-3 NCB x 3, G4 CS for breech) - primary, hypertensive disorder of pregnancy - preeclampsia   Obstetric History                Physical Exam    Airway  Mallampati: I  TM distance: >3 FB  Neck ROM: full     Cardiovascular    Dental    Pulmonary    Abdominal            Anesthesia Plan    History of general anesthesia?: no  History of complications of general anesthesia?: no    ASA 3     spinal   (Epidural and GA R/B discussed.  Questions welcomed.  Pt agrees with plan.)  Anesthetic plan and risks discussed with patient.  Use of blood products discussed with patient who consented to blood products.    Plan discussed with attending and CRNA.

## 2024-02-13 NOTE — ANESTHESIA PROCEDURE NOTES
Spinal Block    Patient location during procedure: OR  Start time: 2/13/2024 4:19 AM  End time: 2/13/2024 4:26 AM  Reason for block: primary anesthetic  Staffing  Performed: CRNA   Authorized by: Lala Crisostomo MD    Performed by: SYMONE Loredo    Preanesthetic Checklist  Completed: patient identified, IV checked, risks and benefits discussed, surgical consent, pre-op evaluation, timeout performed and sterile techniques followed  Block Timeout  RN/Licensed healthcare professional reads aloud to the Anesthesia provider and entire team: Patient identity, procedure with side and site, patient position, and as applicable the availability of implants/special equipment/special requirements.  Patient on coagulant treatment: no  Timeout performed at: 2/13/2024 4:20 AM  Spinal Block  Patient position: sitting  Prep: ChloraPrep  Sterility prep: cap, drape, gloves and mask  Sedation level: no sedation  Patient monitoring: blood pressure, continuous pulse oximetry and heart rate  Approach: midline  Vertebral space: L3-4  Injection technique: single-shot  Needle  Needle type: pencil-point   Needle gauge: 24 G  Needle length: 4 in  Free flowing CSF: yes    Assessment bilateral  Block outcome: block to be assessed in the OR  Procedure assessment: patient tolerated procedure well with no immediate complications  Additional Notes  Easy first pass placement

## 2024-02-14 LAB
ERYTHROCYTE [DISTWIDTH] IN BLOOD BY AUTOMATED COUNT: 13.7 % (ref 11.5–14.5)
HCT VFR BLD AUTO: 31.8 % (ref 36–46)
HGB BLD-MCNC: 10.7 G/DL (ref 12–16)
MCH RBC QN AUTO: 31.8 PG (ref 26–34)
MCHC RBC AUTO-ENTMCNC: 33.6 G/DL (ref 32–36)
MCV RBC AUTO: 94 FL (ref 80–100)
NRBC BLD-RTO: 0 /100 WBCS (ref 0–0)
PLATELET # BLD AUTO: 196 X10*3/UL (ref 150–450)
RBC # BLD AUTO: 3.37 X10*6/UL (ref 4–5.2)
WBC # BLD AUTO: 10.1 X10*3/UL (ref 4.4–11.3)

## 2024-02-14 PROCEDURE — 1210000001 HC SEMI-PRIVATE ROOM DAILY

## 2024-02-14 PROCEDURE — 99199 UNLISTED SPECIAL SVC PX/RPRT: CPT

## 2024-02-14 PROCEDURE — 2500000004 HC RX 250 GENERAL PHARMACY W/ HCPCS (ALT 636 FOR OP/ED)

## 2024-02-14 PROCEDURE — 2500000001 HC RX 250 WO HCPCS SELF ADMINISTERED DRUGS (ALT 637 FOR MEDICARE OP)

## 2024-02-14 PROCEDURE — 36415 COLL VENOUS BLD VENIPUNCTURE: CPT

## 2024-02-14 PROCEDURE — 99231 SBSQ HOSP IP/OBS SF/LOW 25: CPT | Performed by: NURSE PRACTITIONER

## 2024-02-14 PROCEDURE — 2500000002 HC RX 250 W HCPCS SELF ADMINISTERED DRUGS (ALT 637 FOR MEDICARE OP, ALT 636 FOR OP/ED)

## 2024-02-14 PROCEDURE — 2500000002 HC RX 250 W HCPCS SELF ADMINISTERED DRUGS (ALT 637 FOR MEDICARE OP, ALT 636 FOR OP/ED): Performed by: NURSE PRACTITIONER

## 2024-02-14 PROCEDURE — 85027 COMPLETE CBC AUTOMATED: CPT

## 2024-02-14 RX ORDER — MISOPROSTOL 200 UG/1
800 TABLET ORAL ONCE AS NEEDED
Status: DISCONTINUED | OUTPATIENT
Start: 2024-02-14 | End: 2024-02-18 | Stop reason: HOSPADM

## 2024-02-14 RX ORDER — METHYLERGONOVINE MALEATE 0.2 MG/ML
0.2 INJECTION INTRAVENOUS ONCE AS NEEDED
Status: DISCONTINUED | OUTPATIENT
Start: 2024-02-14 | End: 2024-02-18 | Stop reason: HOSPADM

## 2024-02-14 RX ORDER — NIFEDIPINE 30 MG/1
30 TABLET, FILM COATED, EXTENDED RELEASE ORAL ONCE
Status: COMPLETED | OUTPATIENT
Start: 2024-02-14 | End: 2024-02-14

## 2024-02-14 RX ORDER — NIFEDIPINE 30 MG/1
30 TABLET, FILM COATED, EXTENDED RELEASE ORAL
Status: DISCONTINUED | OUTPATIENT
Start: 2024-02-14 | End: 2024-02-15

## 2024-02-14 RX ORDER — NIFEDIPINE 60 MG/1
60 TABLET, FILM COATED, EXTENDED RELEASE ORAL EVERY 24 HOURS
Status: DISCONTINUED | OUTPATIENT
Start: 2024-02-15 | End: 2024-02-15

## 2024-02-14 RX ORDER — OXYTOCIN 10 [USP'U]/ML
10 INJECTION, SOLUTION INTRAMUSCULAR; INTRAVENOUS ONCE AS NEEDED
Status: DISCONTINUED | OUTPATIENT
Start: 2024-02-14 | End: 2024-02-18 | Stop reason: HOSPADM

## 2024-02-14 RX ORDER — TRANEXAMIC ACID 100 MG/ML
1000 INJECTION, SOLUTION INTRAVENOUS ONCE AS NEEDED
Status: DISCONTINUED | OUTPATIENT
Start: 2024-02-14 | End: 2024-02-18 | Stop reason: HOSPADM

## 2024-02-14 RX ORDER — OXYTOCIN/0.9 % SODIUM CHLORIDE 30/500 ML
60 PLASTIC BAG, INJECTION (ML) INTRAVENOUS ONCE AS NEEDED
Status: DISCONTINUED | OUTPATIENT
Start: 2024-02-14 | End: 2024-02-18 | Stop reason: HOSPADM

## 2024-02-14 RX ADMIN — IBUPROFEN 600 MG: 600 TABLET ORAL at 22:17

## 2024-02-14 RX ADMIN — ACETAMINOPHEN 975 MG: 325 TABLET ORAL at 16:59

## 2024-02-14 RX ADMIN — IBUPROFEN 600 MG: 600 TABLET ORAL at 04:52

## 2024-02-14 RX ADMIN — NIFEDIPINE 30 MG: 30 TABLET, FILM COATED, EXTENDED RELEASE ORAL at 04:52

## 2024-02-14 RX ADMIN — IBUPROFEN 600 MG: 600 TABLET ORAL at 16:59

## 2024-02-14 RX ADMIN — ACETAMINOPHEN 975 MG: 325 TABLET ORAL at 04:52

## 2024-02-14 RX ADMIN — ACETAMINOPHEN 975 MG: 325 TABLET ORAL at 11:04

## 2024-02-14 RX ADMIN — IBUPROFEN 600 MG: 600 TABLET ORAL at 11:04

## 2024-02-14 RX ADMIN — NIFEDIPINE 30 MG: 30 TABLET, FILM COATED, EXTENDED RELEASE ORAL at 16:59

## 2024-02-14 RX ADMIN — ENOXAPARIN SODIUM 40 MG: 100 INJECTION SUBCUTANEOUS at 04:54

## 2024-02-14 RX ADMIN — ACETAMINOPHEN 975 MG: 325 TABLET ORAL at 22:17

## 2024-02-14 ASSESSMENT — PAIN SCALES - GENERAL
PAINLEVEL_OUTOF10: 0 - NO PAIN
PAINLEVEL_OUTOF10: 0 - NO PAIN
PAINLEVEL_OUTOF10: 4
PAINLEVEL_OUTOF10: 2
PAINLEVEL_OUTOF10: 0 - NO PAIN
PAIN_LEVEL: 0
PAINLEVEL_OUTOF10: 0 - NO PAIN
PAINLEVEL_OUTOF10: 0 - NO PAIN
PAINLEVEL_OUTOF10: 4
PAINLEVEL_OUTOF10: 0 - NO PAIN

## 2024-02-14 ASSESSMENT — PAIN - FUNCTIONAL ASSESSMENT: PAIN_FUNCTIONAL_ASSESSMENT: 0-10

## 2024-02-14 ASSESSMENT — PAIN DESCRIPTION - DESCRIPTORS
DESCRIPTORS: ACHING
DESCRIPTORS: ACHING

## 2024-02-14 NOTE — ANESTHESIA POSTPROCEDURE EVALUATION
Patient: Christine Navarro    Procedure Summary       Date: 24 Room / Location: [facility]    Anesthesia Start: 415 Anesthesia Stop: 544    Procedure: OBGYN Delivery  Section Diagnosis: (Other (Add Comments))    Surgeons: Leeann Rodriguez MD Responsible Provider: Lala Crisostomo MD    Anesthesia Type: spinal ASA Status: 3            Anesthesia Type: spinal    Christine Navarro is a 33 y.o., , who had a , Low Transverse  delivery on 2024  at 35w2d and is now POD1.    She had Neuraxial Anesthesia without immediate complications noted.       Pain well controlled    Vitals:    24 0753   BP: 139/87   Pulse: 80   Resp: 18   Temp: 36.7 °C (98.1 °F)   SpO2: 98%       Neuraxial site assessed. No visible redness or swelling or drainage. Patient able to ambulate and move all extremities without difficulty. Able to void. No complaints of nausea/vomiting. Tolerating PO intake well. No s/sx of PDPH.     Anesthesia will sign off     SÁNCHEZ Marroquin       Anesthesia Post Evaluation    Patient location during evaluation: bedside  Patient participation: complete - patient participated  Level of consciousness: awake and alert  Pain score: 0  Pain management: satisfactory to patient  Airway patency: patent  Cardiovascular status: stable  Respiratory status: room air  Hydration status: stable  Postoperative Nausea and Vomiting: none        There were no known notable events for this encounter.

## 2024-02-14 NOTE — PROGRESS NOTES
Postpartum Progress Note    Assessment/Plan   Christine Navarro is a 33 y.o., , who delivered at 35w2d gestation and is now postpartum day 1 s/p rLTCS. Has sPEC      s/p rLTCS   -Pain well controlled on current regimen    -Afebrile  - Increased gas pain/abdominal distension; encouraged ambulation and PO intake as tolerated, chewing gum, and simethicone/Milk of Magnesia and/or laxative.   - awaiting spontaneous void  -Admission hgb 12.9--> --> POD#1 10.7   -Continue routine postoperative care   -Adequate UOP     sPEC  - gHTN -> sPEC by severes requiring IV tx  - Nifed 30 (2) -> 60   - s/p Mg  - HELLP labs wnl x2     Feeding - breast/formula  -Continue to encourage pupming  -Lactation consult as needed     DVT prophylaxis  -VTE risk score = 8, lovenox ppx  -SCDs  -Ambulation     Dispo: anticipate discharge on POD#3 if continues to meet milestones. Plan for follow up in 1 week for BP check, 2 wks for incision check and in 4-6 weeks for PP visit.    Principal Problem:    Preeclampsia, third trimester  Active Problems:    Gastroesophageal reflux disease      Subjective   Patient resting comfortably in bed. Denies HA, scotoma, RUQ pain, SOB, chest pain. Denies abdominal pain.    ambulating independently, passing flatus, tolerating PO intake, lochia light.     Objective   Allergies:   Patient has no known allergies.         Last Vitals:  Temp Pulse Resp BP MAP Pulse Ox   37 °C (98.6 °F) 85 18 (!) 142/96   97 %     Vitals Min/Max Last 24 Hours:  Temp  Min: 36.4 °C (97.5 °F)  Max: 37 °C (98.6 °F)  Pulse  Min: 70  Max: 106  Resp  Min: 16  Max: 20  BP  Min: 111/74  Max: 154/106    Intake/Output:     Intake/Output Summary (Last 24 hours) at 2024 08  Last data filed at 2024 0513  Gross per 24 hour   Intake 1972.23 ml   Output 7319 ml   Net -5346.77 ml         Physical Exam:  Constitutional: No visible distress, alert and cooperative, calm  Respiratory/Thorax: Normal respiratory effort on RA. Lungs  "CTAB.  Cardiovascular: RRR, no swelling  Gastrointestinal: soft, mildly distended, nontender, incision with dressing in place, clean and dry  Obstetrical: fundus unable to palpate 2/2 mild abd distension; lochia light  Psychological: Appropriate mood and behavior      Lab Data:  Lab Results   Component Value Date    WBC 10.1 02/14/2024    HGB 10.7 (L) 02/14/2024    HCT 31.8 (L) 02/14/2024     02/14/2024     Lab Results   Component Value Date    GLUCOSE 122 (H) 02/12/2024     (L) 02/12/2024    K 3.9 02/12/2024     02/12/2024    CO2 21 02/12/2024    ANIONGAP 15 02/12/2024    BUN 7 02/12/2024    CREATININE 0.47 (L) 02/12/2024    EGFR >90 02/12/2024    CALCIUM 8.7 02/12/2024    ALBUMIN 3.8 02/12/2024    PROT 6.5 02/12/2024    ALKPHOS 93 02/12/2024    ALT 12 02/12/2024    AST 17 02/12/2024    BILITOT 0.5 02/12/2024     No results found for: \"UTPCR\"  "

## 2024-02-14 NOTE — CARE PLAN
The patient's goals for the shift include to feel better     The clinical goals for the shift include maintain BP <160/110    Problem: Vaginal Birth or  Section  Goal: Fetal and maternal status remain reassuring during the birth process  Outcome: Met  Goal: Demonstrates labor coping techniques through delivery  Outcome: Met     Problem: Hypertensive Disorder of Pregnancy (HDP)  Goal: Minimal s/sx of HDP and BP<160/110  Outcome: Progressing     Problem: Pain - Adult  Goal: Verbalizes/displays adequate comfort level or baseline comfort level  Outcome: Progressing     Problem: Safety - Adult  Goal: Free from fall injury  Outcome: Progressing

## 2024-02-15 ENCOUNTER — APPOINTMENT (OUTPATIENT)
Dept: RADIOLOGY | Facility: CLINIC | Age: 34
End: 2024-02-15
Payer: COMMERCIAL

## 2024-02-15 ENCOUNTER — APPOINTMENT (OUTPATIENT)
Dept: OBSTETRICS AND GYNECOLOGY | Facility: CLINIC | Age: 34
End: 2024-02-15
Payer: COMMERCIAL

## 2024-02-15 LAB
BLOOD EXPIRATION DATE: NORMAL
DISPENSE STATUS: NORMAL
GP B STREP GENITAL QL CULT: ABNORMAL
PRODUCT BLOOD TYPE: 6200
PRODUCT CODE: NORMAL
UNIT ABO: NORMAL
UNIT NUMBER: NORMAL
UNIT RH: NORMAL
UNIT VOLUME: 350
XM INTEP: NORMAL

## 2024-02-15 PROCEDURE — 2500000004 HC RX 250 GENERAL PHARMACY W/ HCPCS (ALT 636 FOR OP/ED)

## 2024-02-15 PROCEDURE — 2500000001 HC RX 250 WO HCPCS SELF ADMINISTERED DRUGS (ALT 637 FOR MEDICARE OP)

## 2024-02-15 PROCEDURE — 2500000002 HC RX 250 W HCPCS SELF ADMINISTERED DRUGS (ALT 637 FOR MEDICARE OP, ALT 636 FOR OP/ED): Performed by: NURSE PRACTITIONER

## 2024-02-15 PROCEDURE — 1100000001 HC PRIVATE ROOM DAILY

## 2024-02-15 PROCEDURE — 2500000002 HC RX 250 W HCPCS SELF ADMINISTERED DRUGS (ALT 637 FOR MEDICARE OP, ALT 636 FOR OP/ED): Performed by: STUDENT IN AN ORGANIZED HEALTH CARE EDUCATION/TRAINING PROGRAM

## 2024-02-15 PROCEDURE — 2500000001 HC RX 250 WO HCPCS SELF ADMINISTERED DRUGS (ALT 637 FOR MEDICARE OP): Performed by: NURSE PRACTITIONER

## 2024-02-15 PROCEDURE — 2500000005 HC RX 250 GENERAL PHARMACY W/O HCPCS

## 2024-02-15 PROCEDURE — 2500000002 HC RX 250 W HCPCS SELF ADMINISTERED DRUGS (ALT 637 FOR MEDICARE OP, ALT 636 FOR OP/ED)

## 2024-02-15 RX ORDER — LABETALOL 100 MG/1
200 TABLET, FILM COATED ORAL 2 TIMES DAILY
Status: DISCONTINUED | OUTPATIENT
Start: 2024-02-15 | End: 2024-02-15

## 2024-02-15 RX ORDER — NIFEDIPINE 30 MG/1
30 TABLET, FILM COATED, EXTENDED RELEASE ORAL ONCE
Status: COMPLETED | OUTPATIENT
Start: 2024-02-15 | End: 2024-02-15

## 2024-02-15 RX ORDER — LABETALOL 100 MG/1
400 TABLET, FILM COATED ORAL 2 TIMES DAILY
Status: DISCONTINUED | OUTPATIENT
Start: 2024-02-16 | End: 2024-02-16

## 2024-02-15 RX ORDER — LABETALOL 100 MG/1
400 TABLET, FILM COATED ORAL ONCE
Status: COMPLETED | OUTPATIENT
Start: 2024-02-15 | End: 2024-02-15

## 2024-02-15 RX ORDER — NIFEDIPINE 60 MG/1
60 TABLET, FILM COATED, EXTENDED RELEASE ORAL EVERY 12 HOURS
Status: DISCONTINUED | OUTPATIENT
Start: 2024-02-15 | End: 2024-02-18 | Stop reason: HOSPADM

## 2024-02-15 RX ORDER — LABETALOL HYDROCHLORIDE 5 MG/ML
20 INJECTION, SOLUTION INTRAVENOUS ONCE
Status: COMPLETED | OUTPATIENT
Start: 2024-02-15 | End: 2024-02-15

## 2024-02-15 RX ORDER — NIFEDIPINE 30 MG/1
30 TABLET, FILM COATED, EXTENDED RELEASE ORAL ONCE
Status: DISCONTINUED | OUTPATIENT
Start: 2024-02-15 | End: 2024-02-15

## 2024-02-15 RX ORDER — LABETALOL 100 MG/1
200 TABLET, FILM COATED ORAL EVERY 8 HOURS
Status: DISCONTINUED | OUTPATIENT
Start: 2024-02-15 | End: 2024-02-15

## 2024-02-15 RX ORDER — NIFEDIPINE 60 MG/1
60 TABLET, FILM COATED, EXTENDED RELEASE ORAL EVERY 24 HOURS
Status: DISCONTINUED | OUTPATIENT
Start: 2024-02-16 | End: 2024-02-15

## 2024-02-15 RX ADMIN — IBUPROFEN 600 MG: 600 TABLET ORAL at 23:59

## 2024-02-15 RX ADMIN — NIFEDIPINE 60 MG: 60 TABLET, FILM COATED, EXTENDED RELEASE ORAL at 17:29

## 2024-02-15 RX ADMIN — DIPHENHYDRAMINE HYDROCHLORIDE 25 MG: 50 INJECTION INTRAMUSCULAR; INTRAVENOUS at 00:45

## 2024-02-15 RX ADMIN — ACETAMINOPHEN 975 MG: 325 TABLET ORAL at 04:56

## 2024-02-15 RX ADMIN — METOCLOPRAMIDE 10 MG: 5 INJECTION, SOLUTION INTRAMUSCULAR; INTRAVENOUS at 00:45

## 2024-02-15 RX ADMIN — IBUPROFEN 600 MG: 600 TABLET ORAL at 17:29

## 2024-02-15 RX ADMIN — LIDOCAINE 1 PATCH: 4 PATCH TOPICAL at 00:43

## 2024-02-15 RX ADMIN — IBUPROFEN 600 MG: 600 TABLET ORAL at 10:45

## 2024-02-15 RX ADMIN — NIFEDIPINE 30 MG: 30 TABLET, FILM COATED, EXTENDED RELEASE ORAL at 07:57

## 2024-02-15 RX ADMIN — LABETALOL HYDROCHLORIDE 20 MG: 5 INJECTION, SOLUTION INTRAVENOUS at 20:54

## 2024-02-15 RX ADMIN — ENOXAPARIN SODIUM 40 MG: 100 INJECTION SUBCUTANEOUS at 04:55

## 2024-02-15 RX ADMIN — ACETAMINOPHEN 975 MG: 325 TABLET ORAL at 23:59

## 2024-02-15 RX ADMIN — ACETAMINOPHEN 975 MG: 325 TABLET ORAL at 10:46

## 2024-02-15 RX ADMIN — LABETALOL HYDROCHLORIDE 200 MG: 100 TABLET, FILM COATED ORAL at 13:55

## 2024-02-15 RX ADMIN — ACETAMINOPHEN 975 MG: 325 TABLET ORAL at 17:28

## 2024-02-15 RX ADMIN — NIFEDIPINE 30 MG: 30 TABLET, FILM COATED, EXTENDED RELEASE ORAL at 06:02

## 2024-02-15 RX ADMIN — NIFEDIPINE 30 MG: 30 TABLET, FILM COATED, EXTENDED RELEASE ORAL at 02:13

## 2024-02-15 RX ADMIN — LABETALOL HYDROCHLORIDE 400 MG: 100 TABLET, FILM COATED ORAL at 20:54

## 2024-02-15 RX ADMIN — IBUPROFEN 600 MG: 600 TABLET ORAL at 04:56

## 2024-02-15 ASSESSMENT — PAIN SCALES - GENERAL
PAINLEVEL_OUTOF10: 0 - NO PAIN
PAINLEVEL_OUTOF10: 2
PAINLEVEL_OUTOF10: 6
PAINLEVEL_OUTOF10: 4
PAINLEVEL_OUTOF10: 2

## 2024-02-15 ASSESSMENT — PAIN DESCRIPTION - LOCATION
LOCATION: ABDOMEN
LOCATION: ABDOMEN

## 2024-02-15 ASSESSMENT — PAIN DESCRIPTION - DESCRIPTORS: DESCRIPTORS: ACHING

## 2024-02-15 ASSESSMENT — PAIN - FUNCTIONAL ASSESSMENT: PAIN_FUNCTIONAL_ASSESSMENT: 0-10

## 2024-02-15 NOTE — PROGRESS NOTES
"Social Work Assessment       Parents: Rain Navarro  Address: [address] [place], [place] [address]  Phone: [phone]    Referral Reason: NICU Admisson: Coping with Illness/Discharge Planning    Prenatal Care: Yes     Name: Chelsie  [place] : 24    Other Children: Mother states she and father have four other children (ages 12, 10, 7 and 3)    Household Composition: Parents reside together with their four children. Baby will join them once she is ready for discharge    IPV/DV or Safety Concerns: None reported    Car-Seat: Yes  Safe Sleep Space: Yes   Safe Sleep Education: SWRK discussed safe sleep practices     Transportation Concerns: None identified    School/Work/Income: Father is employed. 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     Substance Use History: Denies    Toxicology Screens: No screens completed during pregnancy or at time of delivery    Department of Children and Family Services (DCFS): Denies      Assessment: SWRK met with parents (Rain) on MAC 4 to introduce role, offer support and discuss resources. Parents were open and receptive to speaking with social work.     Mother delivered a baby girl named \"Chelsie\" on 24 via  @35.2 wga. Mother states she was scheduled to be induced at 37 weeks. Mother reports receiving her PNC with  throughout her pregnancy. Parents have four other children but report this is their first child who has been admitted to the NICU. Parents appear to be coping well and just want Chelsie to get the best care.    Father is planning to add baby to his insurance. SWRK instructed parents on how to go about doing that including using the crib card as proof of birth. Parents aware they have 30 days to get baby added from date of birth. SWRK discussed [facility] and " provided brochure and application.     Parents state they have all needed supplies for baby including a car seat and safe sleep. SWRK reviewed safe sleep.     SWRK discussed Baby Blues vs PPD and provided handout. Mother aware of PPD and denies experiencing it with her other children. SWRK made mother aware of support services through the hospital if needed as well as reaching out to her OB if needed.     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 Father is a  and states he receives paid maternity leave. Parents deny financial concerns. They plan to have baby followed by Dr. Scott with  [facility].     SWRK discussed visitor policy, [employer] and [facility]. SWRK encouraged parents to practice self-care.     SWRK provided four single day green passes as father has been coming and going each day as well as four blue meal passes. Parents appreciative.       Plan: There are no psychosocial concerns; SWRK will remain available to follow.       ASH Fuller  Ext [phone] [facility]

## 2024-02-15 NOTE — CARE PLAN
The patient's goals for the shift include rest    The clinical goals for the shift include maintain BPs <160/110, no s/sx HDP, meet postpartum milestones    VS stable overnight. 2x milds overnight, PP team adjusted Nifed regimen. HA resolved w/ reglan and benadryl. Meeting postpartum milestones: ambulating, visiting infant in NICU, pumping, lochia and fundus WNL. +flatus, voiding appropriately. Incision dressing clean, dry, intact.

## 2024-02-15 NOTE — PROGRESS NOTES
"Postpartum Progress Note    Assessment/Plan   Christine Navarro is a 33 y.o., , who delivered at 35w2d gestation and is now postpartum day 2 s/p rLTCS. Has sPEC.     s/p rLTCS   -Pain well controlled on current regimen    -Admission hgb 12.9--> --> POD#1 10.7   - Defers contraception to primary OB/PP visit. We discussed pregnancy spacing of at least one year, abstaining from intercourse for 6wks, and the ability to become pregnant in the absence of regular menses. Pt verbalized understanding.     sPEC  - gHTN -> sPEC by severes requiring IV tx  - Nifed 30 () -> 60 am/30pm -> 90am/30pm  - s/p Mg  - HELLP labs wnl x2  - intermittent HA relieved with tylenol    Feeding - breast/formula  -Continue to encourage pupming  -Lactation consult as needed     DVT prophylaxis  -VTE risk score = 8, lovenox ppx  -SCDs  -Ambulation     The patient's blood type is A POS. Rhogam is not indicated.      Dispo: anticipate discharge on POD#3 if continues to meet milestones. Plan for follow up in 1 week for BP check, 2 wks for incision check and in 4-6 weeks for PP visit.    Principal Problem:    Preeclampsia, third trimester  Active Problems:    Gastroesophageal reflux disease      Subjective   Patient resting comfortably in bed. Denies HA, scotoma, RUQ pain, SOB, chest pain. Denies abdominal pain. Feels \"really tired\" but otherwise well. The lidocaine patch is very helpful.     ambulating independently, passing flatus, tolerating PO intake, lochia light.     Objective   Allergies:   Patient has no known allergies.         Last Vitals:  Temp Pulse Resp BP MAP Pulse Ox   36.4 °C (97.5 °F) 86 18 (!) 150/106   97 %     Vitals Min/Max Last 24 Hours:  Temp  Min: 36.4 °C (97.5 °F)  Max: 36.9 °C (98.4 °F)  Pulse  Min: 80  Max: 90  Resp  Min: 18  Max: 18  BP  Min: 139/87  Max: 157/96    Intake/Output:     Intake/Output Summary (Last 24 hours) at 2/15/2024 0702  Last data filed at 2024 0837  Gross per 24 hour   Intake -- " "  Output 1200 ml   Net -1200 ml         Physical Exam:  Constitutional: No visible distress, alert and cooperative, calm  Respiratory/Thorax: Normal respiratory effort on RA. Lungs CTAB.  Cardiovascular: RRR, no swelling  Gastrointestinal: soft, mildly distended, nontender, incision with dressing in place, clean and dry  Obstetrical: fundus unable to palpate 2/2 mild abd distension; lochia light; CS incision   Psychological: Appropriate mood and behavior      Lab Data:  Lab Results   Component Value Date    WBC 10.1 02/14/2024    HGB 10.7 (L) 02/14/2024    HCT 31.8 (L) 02/14/2024     02/14/2024     Lab Results   Component Value Date    GLUCOSE 122 (H) 02/12/2024     (L) 02/12/2024    K 3.9 02/12/2024     02/12/2024    CO2 21 02/12/2024    ANIONGAP 15 02/12/2024    BUN 7 02/12/2024    CREATININE 0.47 (L) 02/12/2024    EGFR >90 02/12/2024    CALCIUM 8.7 02/12/2024    ALBUMIN 3.8 02/12/2024    PROT 6.5 02/12/2024    ALKPHOS 93 02/12/2024    ALT 12 02/12/2024    AST 17 02/12/2024    BILITOT 0.5 02/12/2024     No results found for: \"UTPCR\"  "

## 2024-02-16 LAB
LABORATORY COMMENT REPORT: NORMAL
PATH REPORT.FINAL DX SPEC: NORMAL
PATH REPORT.GROSS SPEC: NORMAL
PATH REPORT.RELEVANT HX SPEC: NORMAL
PATH REPORT.TOTAL CANCER: NORMAL

## 2024-02-16 PROCEDURE — 2500000004 HC RX 250 GENERAL PHARMACY W/ HCPCS (ALT 636 FOR OP/ED)

## 2024-02-16 PROCEDURE — 1210000001 HC SEMI-PRIVATE ROOM DAILY

## 2024-02-16 PROCEDURE — 2500000001 HC RX 250 WO HCPCS SELF ADMINISTERED DRUGS (ALT 637 FOR MEDICARE OP)

## 2024-02-16 PROCEDURE — 2500000005 HC RX 250 GENERAL PHARMACY W/O HCPCS

## 2024-02-16 PROCEDURE — 2500000002 HC RX 250 W HCPCS SELF ADMINISTERED DRUGS (ALT 637 FOR MEDICARE OP, ALT 636 FOR OP/ED)

## 2024-02-16 RX ORDER — LABETALOL 100 MG/1
200 TABLET, FILM COATED ORAL ONCE
Status: COMPLETED | OUTPATIENT
Start: 2024-02-16 | End: 2024-02-16

## 2024-02-16 RX ORDER — LABETALOL 100 MG/1
800 TABLET, FILM COATED ORAL ONCE
Status: COMPLETED | OUTPATIENT
Start: 2024-02-16 | End: 2024-02-16

## 2024-02-16 RX ORDER — LABETALOL 100 MG/1
800 TABLET, FILM COATED ORAL EVERY 8 HOURS
Status: DISCONTINUED | OUTPATIENT
Start: 2024-02-17 | End: 2024-02-18 | Stop reason: HOSPADM

## 2024-02-16 RX ADMIN — ACETAMINOPHEN 975 MG: 325 TABLET ORAL at 06:32

## 2024-02-16 RX ADMIN — ACETAMINOPHEN 975 MG: 325 TABLET ORAL at 12:18

## 2024-02-16 RX ADMIN — NIFEDIPINE 60 MG: 60 TABLET, FILM COATED, EXTENDED RELEASE ORAL at 06:33

## 2024-02-16 RX ADMIN — ENOXAPARIN SODIUM 40 MG: 100 INJECTION SUBCUTANEOUS at 06:33

## 2024-02-16 RX ADMIN — LABETALOL HYDROCHLORIDE 800 MG: 100 TABLET, FILM COATED ORAL at 20:44

## 2024-02-16 RX ADMIN — LIDOCAINE 1 PATCH: 4 PATCH TOPICAL at 22:11

## 2024-02-16 RX ADMIN — IBUPROFEN 600 MG: 600 TABLET ORAL at 22:12

## 2024-02-16 RX ADMIN — IBUPROFEN 600 MG: 600 TABLET ORAL at 12:18

## 2024-02-16 RX ADMIN — LABETALOL HYDROCHLORIDE 400 MG: 100 TABLET, FILM COATED ORAL at 08:47

## 2024-02-16 RX ADMIN — IBUPROFEN 600 MG: 600 TABLET ORAL at 06:33

## 2024-02-16 RX ADMIN — NIFEDIPINE 60 MG: 60 TABLET, FILM COATED, EXTENDED RELEASE ORAL at 17:57

## 2024-02-16 RX ADMIN — ACETAMINOPHEN 975 MG: 325 TABLET ORAL at 17:57

## 2024-02-16 RX ADMIN — IBUPROFEN 600 MG: 600 TABLET ORAL at 17:55

## 2024-02-16 RX ADMIN — LABETALOL HYDROCHLORIDE 200 MG: 100 TABLET, FILM COATED ORAL at 15:39

## 2024-02-16 RX ADMIN — DIPHENHYDRAMINE HYDROCHLORIDE 25 MG: 25 CAPSULE ORAL at 00:03

## 2024-02-16 ASSESSMENT — PAIN SCALES - GENERAL
PAINLEVEL_OUTOF10: 6
PAINLEVEL_OUTOF10: 0 - NO PAIN
PAINLEVEL_OUTOF10: 3
PAINLEVEL_OUTOF10: 0 - NO PAIN
PAINLEVEL_OUTOF10: 6
PAINLEVEL_OUTOF10: 6
PAINLEVEL_OUTOF10: 2

## 2024-02-16 ASSESSMENT — PAIN DESCRIPTION - ORIENTATION: ORIENTATION: LOWER

## 2024-02-16 ASSESSMENT — PAIN - FUNCTIONAL ASSESSMENT: PAIN_FUNCTIONAL_ASSESSMENT: 0-10

## 2024-02-16 ASSESSMENT — PAIN DESCRIPTION - DESCRIPTORS: DESCRIPTORS: CRAMPING;ACHING;SORE

## 2024-02-16 ASSESSMENT — PAIN DESCRIPTION - LOCATION: LOCATION: BACK

## 2024-02-16 NOTE — SIGNIFICANT EVENT
BP cuff and Home Monitoring  Ms. Navarro previously reported she had a bp cuff at home but now requests another cuff because she questions the accuracy of home cuff.   Patient meets criteria for home monitoring of blood pressure post discharge.  Met with patient to assess for availability of home BP monitor.  Patient does not have access to BP monitor at home.  Pt agreed to order home BP monitor from [employer]. BP monitor delivered to room.  Patient educated on how to use BP monitor, recording BP’s on home monitoring log and s/sx to report to her provider.  Pt verbalized understanding the above information.    Large cuff given  
Patient meets criteria for home monitoring of blood pressure post discharge.  Met with patient to assess for availability of home BP monitor.  Patient stated she owns home BP monitor. Patient educated on importance of continuing to monitor BP at home, recording BP on home monitoring log and s/sx of when to call her provider.  Pt verbalized understanding the above information.    
S: Christine Navarro is a 33 y.o. now s/p rLTCS on 2/13 whose labor and delivery course has been complicated by sPEC, now s/p mag. House officer notified of persistent SRBP. Patient acutely treated and subsequently brought down to L&D.     Patient reports overall feeling well. She denies HA, blurry vision, or RUQ pain. Reports uterine contractions, especially when breast-pumping for baby in NICU. She is without CP or SOB.       O: BP (!) 148/95   Pulse 75   Temp 36.2 °C (97.2 °F) (Temporal)   Resp 18   LMP 06/11/2023   SpO2 (!) 67%   Breastfeeding Yes      A/P  - /110--> 166/110  - Treated with IV Lab 20mg @ 2100  - BP regimen up-titrated from Nifed 60/60 + Labetalol 200 BID to Nifed 60/60 + Lab 400 BID   - s/p Mag  - Currently asx with MRBP    Dispo: Continued BP monitoring on L&D; once stable, OK for transfer back to [facility] 4.       AM update: patient now without SRBP x >4h,. Currently normotensive. OK for transfer back to Mac 4 at this time.     D/w Roscoe Braun and Noel CRUM. Shayna Le MD  PGY-1, Obstetrics & Gynecology   [facility]   
Transfer to postpartum    Evaluated patient. Will be done with 24hrs PP Mg at 0500. Patient resting comfortably in bed. Denies HA, scotoma, RUQ pain, SOB, chest pain. Will uptitrate to nifed 60 at this time given mild range BP. No other changes to plan. Stable for transfer to postpartum.    D/w Dr. Rebecca Etienne MD PGY-2      
Spontaneous, unlabored and symmetrical

## 2024-02-16 NOTE — CARE PLAN
The patient's goals for the shift include for these BP medications to help keep my BP's down    The clinical goals for the shift include for patient to maintain stable BP's with no s/s pec noted    Patient got transferred to Mac 2 overnight for BP monitoring and her labetalol was increased to 400 mg. BID. Vitals stable otherwise and pain well controlled. Patient sleeping at this time. Will continue to monitor for any changes.

## 2024-02-16 NOTE — PROGRESS NOTES
Postpartum Progress Note    Assessment/Plan   Christine Navarro is a 33 y.o., , who delivered at 35w2d gestation    Now PPD#3 s/p , Low Transverse  on 2024   - continue routine postpartum care  - pain well controlled on po medications  - dvt risk score DVT Score: 8 , ppx with lovenox  - Hgb:   Results from last 7 days   Lab Units 24  0511 24  2202 24  1445   HEMOGLOBIN g/dL 10.7* 12.9 12.1      sPEC  - gHTN -> sPEC by severes requiring IV tx (last treated 2/15 2100)  - Nifed 30 () --> 60am/30pm -> 60/60 +Lab 200 BID --> Nifed 60/60, Lab 400 BID  (2/15)--> Nifed 60/60, Lab 600 BID  ()  - s/p Mg  - HELLP labs wnl x2  - Bp cuff for home     Maternal Well-Being  - emotional support provided    [place] Feeding  - breastfeeding/pumping encouraged; lactation consult prn    Contraception  - education provided  - Patient defers contraception to her 6 wk PP f/u. Recommended pelvic rest and condoms. Reviewed risk of short interval pregnancy,  pt verbalizes understanding.     Dispo  - Anticipate DC PPD4 pending BP control.  - The signs and symptoms of PEC were reviewed with the patient, including unrelenting headache, vision changes/blurred vision, and pain underneath the right breast.   - BP cuff for home for checking BP BID. Pt instructed to call primary OB if SBP > 160 or DBP > 110.   Follow up in 2-5 days for BP check with your OB provider., Follow up in 2 wks for incision check with your OB provider., and Follow up in 4-6 wk for postpartum visit with your OB provider.     Marilee Caicedo PA-C  Postpartum Pager [phone]    Principal Problem:    Preeclampsia, third trimester  Active Problems:    Gastroesophageal reflux disease    Pregnancy Problems (from 21 to present)       Problem Noted Resolved    Preeclampsia, severe, third trimester 2024 by Renee Hill MD No    Priority:  Medium      Preeclampsia, third trimester 2024 by Christine Etienne MD No    Priority:   Medium      Gestational hypertension, third trimester 2024 by Leatha Capone MD No    Priority:  Medium      32 weeks gestation of pregnancy 2024 by Laetha Capone MD No    Priority:  Medium      26 weeks gestation of pregnancy 12/15/2023 by Leatha Capone MD No    Priority:  Medium            Hospital course: postpartum preeclampsia/eclampsia   section delivery  Patient is currently breastfeedingThe patient's blood type is A POS. The baby's blood type is pending . Rhogam is not indicated.    Subjective   Her pain is well controlled with current medications  She is passing flatus  She is ambulating well  She is tolerating a Adult diet Regular  She reports no breast or nursing problems  She denies emotional concerns today   Her plan for contraception is none     Pt seen at the bedside in NAD. Feeling much better today compared to last 2 days. Denies HA, N/V, RUQ pain, vision changes. Denies CP, SOB, calf pain, fever, passage of large clots.     Objective   Allergies:   Patient has no known allergies.         Last Vitals:  Temp Pulse Resp BP MAP Pulse Ox   36.7 °C (98.1 °F) 84 18 (!) 156/106   98 %     Vitals Min/Max Last 24 Hours:  Temp  Min: 36.2 °C (97.2 °F)  Max: 36.9 °C (98.4 °F)  Pulse  Min: 71  Max: 106  Resp  Min: 18  Max: 18  BP  Min: 116/77  Max: 166/110    Intake/Output:   No intake or output data in the 24 hours ending 24 [id number]    Physical Exam:  General: Examination reveals a well developed, well nourished, female, in no acute distress. She is alert and cooperative.  HEENT: External ears normal. Nose normal, no erythema or discharge.  Neck: supple, no significant adenopathy.  Lungs: breathing even and unlabored   Cardiac: warm and well perfused .  Fundus: firm and below umbilicus. Lochia light  Extremities: no redness or tenderness in the calves or thighs, no edema.  Neurological: alert, oriented, normal speech, no focal findings or movement disorder noted.  Psychological: awake and  alert; oriented to person, place, and time.  Skin: incision clean, dry, intact, well approximated, no redness, drainage, or warmth     Lab Data:  Labs in chart were reviewed.

## 2024-02-16 NOTE — CARE PLAN
Problem: Hypertensive Disorder of Pregnancy (HDP)  Goal: Minimal s/sx of HDP and BP<160/110  2024 by Elizabeth Gil RN  Outcome: Not Progressing  2024 by Elizabeth Gil RN  Outcome: Progressing    Problem: Hypertensive Disorder of Pregnancy (HDP)  Goal: Minimal s/sx of HDP and BP<160/110  2024 184 by Elizabeth Gil RN  Outcome: Not Progressing  2024 by Elizabeth Gil RN  Outcome: Progressing     Problem: Pain - Adult  Goal: Verbalizes/displays adequate comfort level or baseline comfort level  2024 by Elizabeth Gil RN  Outcome: Progressing  2024 by Elizabeth Gil RN  Outcome: Progressing     Problem: Safety - Adult  Goal: Free from fall injury  2024 by Elizabeth Gil RN  Outcome: Progressing  2024 by Elizabeth Gil RN  Outcome: Progressing     Problem: Vaginal Birth or  Section  Goal: Fetal and maternal status remain reassuring during the birth process  2024 by Elizabeth Gil RN  Outcome: Progressing  2024 by Elizabeth Gil RN  Outcome: Progressing  Goal: Tolerate CRB for IOL placement maintenance until dislodgement/removal 12hrs after placement  2024 by Elizabeth Gil RN  Outcome: Progressing  2024 by Elizabeth Gil RN  Outcome: Progressing  Goal: Prevention of malpresentation/labor dystocia through delivery  2024 by Elizabeth Gil RN  Outcome: Progressing  2024 by Elizabeth Gil RN  Outcome: Progressing  Goal: Demonstrates labor coping techniques through delivery  2024 by Elizabeth Gil RN  Outcome: Progressing  2024 by Elizabeth Gil RN  Outcome: Progressing  Goal: Minimal s/sx of HDP and BP<160/110  2024 by Elizabeth Gil RN  Outcome: Progressing  2024 by Elizabeth A Gil, RN  Outcome: Progressing  Goal: No s/sx of infection through recovery  2024 1841 by  Elizabeth Gil RN  Outcome: Progressing  2024 by Elizabeth Gil RN  Outcome: Progressing  Goal: No s/sx of hemorrhage through recovery  2024 by Elizabeth Gil RN  Outcome: Progressing  2024 by Elizabeth Gil RN  Outcome: Progressing     Problem: Postpartum  Goal: Experiences normal postpartum course  2024 by Elizabeth Gil RN  Outcome: Progressing  2024 by Elizabeth Gil RN  Outcome: Progressing  Goal: Appropriate maternal -  bonding  2024 by Elziabeth Gil RN  Outcome: Progressing  2024 by Elizabeth Gil RN  Outcome: Progressing  Goal: Establish and maintain infant feeding pattern for adequate nutrition  2024 by Elizabeth Gil RN  Outcome: Progressing  2024 by Elizabeth Gil RN  Outcome: Progressing  Goal: Incisions, wounds, or drain sites healing without S/S of infection  2024 by Elizabeth Gil RN  Outcome: Progressing  2024 by Elizabeth Gil RN  Outcome: Progressing  Goal: No s/sx infection  2024 by Elizabeth Gil RN  Outcome: Progressing  2024 by Elizabeth Gil RN  Outcome: Progressing  Goal: No s/sx of hemorrhage  2024 by Elizabeth Gil RN  Outcome: Progressing  2024 by Elizabeth Gil RN  Outcome: Progressing  Goal: Minimal s/sx of HDP and BP<160/110  2024 by Elizabeth Gil RN  Outcome: Progressing  2024 by Elizabeth Gil RN  Outcome: Progressing     Problem:  Recovery Care  Goal: Verbalizes understanding of post-op instructions  2024 by Elizabeth Gil RN  Outcome: Progressing  2024 by Elizabeth Gil RN  Outcome: Progressing  Goal: Manages discomfort  2024 by Elizabeth Gil RN  Outcome: Progressing  2024 by Elizabeth Gil RN  Outcome: Progressing  Goal: Dressing intact until removed with any drainage  marked  2/16/2024 1841 by Elizabeth Gil RN  Outcome: Progressing  2/16/2024 1841 by Elizabeth Gil RN  Outcome: Progressing  Goal: Patient vital signs are stable  2/16/2024 1841 by Elizabeth Gil RN  Outcome: Progressing  2/16/2024 1841 by Elizabeth Gil RN  Outcome: Progressing  Goal: Urine output is 0.5 mL/kg/hr or more  2/16/2024 1841 by Elizabeth Gil RN  Outcome: Progressing  2/16/2024 1841 by Elizabeth Gil RN  Outcome: Progressing  Goal: Fundus firm at midline  2/16/2024 1841 by Elizabeth Gil RN  Outcome: Progressing  2/16/2024 1841 by Elizabeth Gil RN  Outcome: Progressing    The patient's goals for the shift include for these BP medications to help keep my BP's down    The clinical goals for the shift include BP normotensive, pt shazia remain as/sx of HDP    Pt had one severe BP around 1500 today, followed by a mild BP recheck. Pt denies any s/sx of HDP. Meeting all postpartum milestones otherwise. Birth certificate turned in today

## 2024-02-17 PROCEDURE — 2500000001 HC RX 250 WO HCPCS SELF ADMINISTERED DRUGS (ALT 637 FOR MEDICARE OP)

## 2024-02-17 PROCEDURE — 2500000002 HC RX 250 W HCPCS SELF ADMINISTERED DRUGS (ALT 637 FOR MEDICARE OP, ALT 636 FOR OP/ED)

## 2024-02-17 PROCEDURE — 2500000004 HC RX 250 GENERAL PHARMACY W/ HCPCS (ALT 636 FOR OP/ED)

## 2024-02-17 PROCEDURE — 2500000005 HC RX 250 GENERAL PHARMACY W/O HCPCS

## 2024-02-17 PROCEDURE — 1210000001 HC SEMI-PRIVATE ROOM DAILY

## 2024-02-17 RX ORDER — DIPHENHYDRAMINE HCL 25 MG
25 CAPSULE ORAL ONCE
Status: COMPLETED | OUTPATIENT
Start: 2024-02-18 | End: 2024-02-18

## 2024-02-17 RX ADMIN — LABETALOL HYDROCHLORIDE 800 MG: 100 TABLET, FILM COATED ORAL at 14:11

## 2024-02-17 RX ADMIN — LABETALOL HYDROCHLORIDE 800 MG: 100 TABLET, FILM COATED ORAL at 06:15

## 2024-02-17 RX ADMIN — NIFEDIPINE 60 MG: 60 TABLET, FILM COATED, EXTENDED RELEASE ORAL at 06:15

## 2024-02-17 RX ADMIN — ACETAMINOPHEN 975 MG: 325 TABLET ORAL at 00:23

## 2024-02-17 RX ADMIN — LIDOCAINE 1 PATCH: 4 PATCH TOPICAL at 22:31

## 2024-02-17 RX ADMIN — IBUPROFEN 600 MG: 600 TABLET ORAL at 06:17

## 2024-02-17 RX ADMIN — ENOXAPARIN SODIUM 40 MG: 100 INJECTION SUBCUTANEOUS at 06:14

## 2024-02-17 RX ADMIN — IBUPROFEN 600 MG: 600 TABLET ORAL at 11:54

## 2024-02-17 RX ADMIN — IBUPROFEN 600 MG: 600 TABLET ORAL at 18:14

## 2024-02-17 RX ADMIN — ACETAMINOPHEN 975 MG: 325 TABLET ORAL at 19:33

## 2024-02-17 RX ADMIN — ACETAMINOPHEN 975 MG: 325 TABLET ORAL at 06:14

## 2024-02-17 RX ADMIN — NIFEDIPINE 60 MG: 60 TABLET, FILM COATED, EXTENDED RELEASE ORAL at 17:07

## 2024-02-17 RX ADMIN — LABETALOL HYDROCHLORIDE 800 MG: 100 TABLET, FILM COATED ORAL at 22:30

## 2024-02-17 RX ADMIN — ACETAMINOPHEN 975 MG: 325 TABLET ORAL at 13:37

## 2024-02-17 ASSESSMENT — PAIN SCALES - GENERAL
PAINLEVEL_OUTOF10: 2
PAINLEVEL_OUTOF10: 5 - MODERATE PAIN
PAINLEVEL_OUTOF10: 0 - NO PAIN
PAINLEVEL_OUTOF10: 2
PAINLEVEL_OUTOF10: 3
PAINLEVEL_OUTOF10: 2

## 2024-02-17 ASSESSMENT — PAIN - FUNCTIONAL ASSESSMENT
PAIN_FUNCTIONAL_ASSESSMENT: 0-10

## 2024-02-17 ASSESSMENT — PAIN DESCRIPTION - LOCATION
LOCATION: BACK
LOCATION: HEAD
LOCATION: BACK

## 2024-02-17 NOTE — PROGRESS NOTES
Postpartum Progress Note    Assessment/Plan   Christine Navarro is a 33 y.o., , who delivered at 35w2d gestation    Now PPD#4 s/p , Low Transverse  on 2024   - continue routine postpartum care  - pain well controlled on po medications  - dvt risk score DVT Score: 8 , ppx with lovenox  - Hgb:   Results from last 7 days   Lab Units 24  0511 24  2202 24  1445   HEMOGLOBIN g/dL 10.7* 12.9 12.1     sPEC  - gHTN -> sPEC by severes requiring IV tx (last treated 2/15 2100)  - Multiple non-sustained severe range bps overnight, normotensive this morning after up titration of meds as below   - Nifed 30 () --> 60am/30pm -> 60/60 +Lab 200 BID --> Nifed 60/60, Lab 400 BID  (2/15)--> Nifed 60/60, Lab 600 BID  ()-> Nifed 60/60 + lab 800 TID ()   - s/p Mg  - HELLP labs wnl x2  - Bp cuff for home      Maternal Well-Being  - emotional support provided     [place] Feeding  - breastfeeding/pumping encouraged; lactation consult prn     Contraception  - education provided  - Patient defers contraception to her 6 wk PP f/u. Recommended pelvic rest and condoms. Reviewed risk of short interval pregnancy,  pt verbalizes understanding.      Dispo  - Anticipate DC PPD5 pending BP control.  - The signs and symptoms of PEC were reviewed with the patient, including unrelenting headache, vision changes/blurred vision, and pain underneath the right breast.   - BP cuff for home for checking BP BID. Pt instructed to call primary OB if SBP > 160 or DBP > 110.   Follow up in 2-5 days for BP check with your OB provider., Follow up in 2 wks for incision check with your OB provider., and Follow up in 4-6 wk for postpartum visit with your OB provider.      Marilee Caicedo PA-C  Postpartum Pager [phone]    Principal Problem:    Preeclampsia, third trimester  Active Problems:    Gastroesophageal reflux disease    Pregnancy Problems (from 21 to present)       Problem Noted Resolved    Preeclampsia, severe,  third trimester 2024 by Renee Hill MD No    Priority:  Medium      Preeclampsia, third trimester 2024 by Christine Etienne MD No    Priority:  Medium      Gestational hypertension, third trimester 2024 by Leatha Capone MD No    Priority:  Medium      32 weeks gestation of pregnancy 2024 by Leatha Capone MD No    Priority:  Medium      26 weeks gestation of pregnancy 12/15/2023 by Leatha Capone MD No    Priority:  Medium            Hospital course: postpartum preeclampsia/eclampsia   section delivery  Patient is currently breastfeedingThe patient's blood type is A POS. The baby's blood type is pending . Rhogam is not indicated.    Subjective   Her pain is well controlled with current medications  She is passing flatus  She is ambulating well  She is tolerating a Adult diet Regular  She reports no breast or nursing problems  She denies emotional concerns today   Her plan for contraception is none     Pt seen at the bedside in NAD. Mild HA today but improved with tylenol. Denies RUQ pain or vision changes.   Denies CP, SOB, calf pain, fever, passage of large clots.     Objective   Allergies:   Patient has no known allergies.         Last Vitals:  Temp Pulse Resp BP MAP Pulse Ox   36.6 °C (97.9 °F) 98 18 124/76   98 %     Vitals Min/Max Last 24 Hours:  Temp  Min: 36.4 °C (97.5 °F)  Max: 36.8 °C (98.2 °F)  Pulse  Min: 73  Max: 107  Resp  Min: 18  Max: 20  BP  Min: 111/70  Max: 169/117    Intake/Output:   No intake or output data in the 24 hours ending 24 1421    Physical Exam:  General: Examination reveals a well developed, well nourished, female, in no acute distress. She is alert and cooperative.  HEENT: External ears normal. Nose normal, no erythema or discharge.  Neck: supple, no significant adenopathy.  Lungs: breathing even and unlabored   Cardiac: warm and well perfused .  Fundus: firm and below umbilicus. Lochia light  Extremities: no redness or tenderness in the calves or  thighs, no edema.  Neurological: alert, oriented, normal speech, no focal findings or movement disorder noted.  Psychological: awake and alert; oriented to person, place, and time.  Skin: incision clean, dry, intact, well approximated, no redness, drainage, or warmth     Lab Data:  Labs in chart were reviewed.

## 2024-02-17 NOTE — CARE PLAN
The patient's goals for the shift include BP meds are working and safe to go home.    The clinical goals for the shift include BP<160/110    Pt has been pumping and visiting the NICU. BP's were < 160/110 this shift. Pain well controlled with tylenol and motrin.   Problem: Hypertensive Disorder of Pregnancy (HDP)  Goal: Minimal s/sx of HDP and BP<160/110  Outcome: Progressing     Problem: Pain - Adult  Goal: Verbalizes/displays adequate comfort level or baseline comfort level  Outcome: Progressing     Problem: Safety - Adult  Goal: Free from fall injury  Outcome: Progressing     Problem: Vaginal Birth or  Section  Goal: Fetal and maternal status remain reassuring during the birth process  Outcome: Progressing  Goal: Tolerate CRB for IOL placement maintenance until dislodgement/removal 12hrs after placement  Outcome: Progressing  Goal: Prevention of malpresentation/labor dystocia through delivery  Outcome: Progressing  Goal: Demonstrates labor coping techniques through delivery  Outcome: Progressing  Goal: Minimal s/sx of HDP and BP<160/110  Outcome: Progressing  Goal: No s/sx of infection through recovery  Outcome: Progressing  Goal: No s/sx of hemorrhage through recovery  Outcome: Progressing     Problem: Postpartum  Goal: Experiences normal postpartum course  Outcome: Progressing  Goal: Appropriate maternal -  bonding  Outcome: Progressing  Goal: Establish and maintain infant feeding pattern for adequate nutrition  Outcome: Progressing  Goal: Incisions, wounds, or drain sites healing without S/S of infection  Outcome: Progressing  Goal: No s/sx infection  Outcome: Progressing  Goal: No s/sx of hemorrhage  Outcome: Progressing  Goal: Minimal s/sx of HDP and BP<160/110  Outcome: Progressing     Problem:  Recovery Care  Goal: Verbalizes understanding of post-op instructions  Outcome: Progressing  Goal: Manages discomfort  Outcome: Progressing  Goal: Dressing intact until removed with any  drainage marked  Outcome: Progressing  Goal: Patient vital signs are stable  Outcome: Progressing  Goal: Urine output is 0.5 mL/kg/hr or more  Outcome: Progressing  Goal: Fundus firm at midline  Outcome: Progressing

## 2024-02-17 NOTE — LACTATION NOTE
Lactation Consultant Note  Lactation Consultation  Reason for Consult: Initial assessment, NICU baby  Consultant Name: Mariely Solorzano RN, IBCLC    Maternal Information  Has mother  before?: Yes  How long did the mother previously breastfeed?: breast fed 4 other children -for varying amount of time - 8 months = 16 months  Infant to breast within first 2 hours of birth?: Yes  Exclusive Pump and Bottle Feed: No    Maternal Assessment       Infant Assessment       Feeding Assessment  Feeding Method: Nursing at the breast, Paced bottle  Unable to assess infant feeding at this time: Other (Comment) (Baby remains in the NICU- mom latching and pumping)    LATCH TOOL       Breast Pump  Pump: Hospital grade electric pump, Double breast pumping  Frequency: 5-7 times per day  Duration: 15-20 minutes per session  Units of Volume: Ounces per session    Other OB Lactation Tools       Patient Follow-up  Inpatient Lactation Follow-up Needed : No  Outpatient Lactation Follow-up: Recommended    Other OB Lactation Documentation  Maternal Risk Factors: Preeclampsia, Other (comment) (pumping (and latching))  Infant Risk Factors: Prematurity <37 weeks    Recommendations/Summary  Baby remains in the NICU at this time.   Mom stated she has been latching and pumping and giving the baby pumped breast milk via bottle nipple.   She is pumping out ounces at a time.   She breast fed all of her children and the last baby was breast fed up to 16 months of age.     She stated she does not pump at night - she sleeps and then pumps in the morning. Reviewed the importance of 8-12 latching/ pumping times in a 24 ho ur period and how long periods of not emptying her breasts can lead to plugged ducts and/ or mastitis in addition to poor milk supply. She stated she did this with all of her children and didn't have any issues with milk production.     Reviewed the differences between full term breast feeding and late  breast  feeding.     Encouraged mom to breastfeed on demand with a goal of 8-12 feeds in a 24 hour period.   If baby is not showing feeding cues and it has been 3 hours since the last time the baby was fed or the last time the baby attempted to feed, encouraged her to place baby in skin to skin.    Encouraged her to pump every 3 hours (8-12 times in a 24 hour period) for 20 minutes on both breasts and to give the baby any pumped breast milk.     Mom has a breast pump for at home.   Encouraged her to utilize the outpatient lactation resources, if needed.   Contact information given.   [phone] [facility] or [phone] [place]    Denies any questions or concerns at this time.

## 2024-02-18 PROCEDURE — 2500000002 HC RX 250 W HCPCS SELF ADMINISTERED DRUGS (ALT 637 FOR MEDICARE OP, ALT 636 FOR OP/ED)

## 2024-02-18 PROCEDURE — 2500000001 HC RX 250 WO HCPCS SELF ADMINISTERED DRUGS (ALT 637 FOR MEDICARE OP)

## 2024-02-18 PROCEDURE — 99238 HOSP IP/OBS DSCHRG MGMT 30/<: CPT

## 2024-02-18 PROCEDURE — 2500000004 HC RX 250 GENERAL PHARMACY W/ HCPCS (ALT 636 FOR OP/ED)

## 2024-02-18 RX ORDER — IBUPROFEN 600 MG/1
600 TABLET ORAL EVERY 6 HOURS
Qty: 40 TABLET | Refills: 0 | Status: SHIPPED | OUTPATIENT
Start: 2024-02-18 | End: 2024-02-28

## 2024-02-18 RX ORDER — NIFEDIPINE 60 MG/1
60 TABLET, FILM COATED, EXTENDED RELEASE ORAL EVERY 12 HOURS
Qty: 60 TABLET | Refills: 0 | Status: ON HOLD | OUTPATIENT
Start: 2024-02-18 | End: 2024-02-21 | Stop reason: SDUPTHER

## 2024-02-18 RX ORDER — POLYETHYLENE GLYCOL 3350 17 G/17G
17 POWDER, FOR SOLUTION ORAL 2 TIMES DAILY
Qty: 10 PACKET | Refills: 0 | Status: SHIPPED | OUTPATIENT
Start: 2024-02-18 | End: 2024-02-23

## 2024-02-18 RX ORDER — LABETALOL 200 MG/1
800 TABLET, FILM COATED ORAL EVERY 8 HOURS
Qty: 360 TABLET | Refills: 0 | Status: SHIPPED | OUTPATIENT
Start: 2024-02-18 | End: 2024-02-21 | Stop reason: HOSPADM

## 2024-02-18 RX ORDER — ACETAMINOPHEN 325 MG/1
975 TABLET ORAL EVERY 6 HOURS
Qty: 120 TABLET | Refills: 0 | Status: SHIPPED | OUTPATIENT
Start: 2024-02-18 | End: 2024-02-28

## 2024-02-18 RX ADMIN — IBUPROFEN 600 MG: 600 TABLET ORAL at 05:08

## 2024-02-18 RX ADMIN — LABETALOL HYDROCHLORIDE 800 MG: 100 TABLET, FILM COATED ORAL at 06:36

## 2024-02-18 RX ADMIN — NIFEDIPINE 60 MG: 60 TABLET, FILM COATED, EXTENDED RELEASE ORAL at 05:08

## 2024-02-18 RX ADMIN — ACETAMINOPHEN 975 MG: 325 TABLET ORAL at 05:07

## 2024-02-18 RX ADMIN — DIPHENHYDRAMINE HYDROCHLORIDE 25 MG: 25 CAPSULE ORAL at 00:19

## 2024-02-18 RX ADMIN — ENOXAPARIN SODIUM 40 MG: 100 INJECTION SUBCUTANEOUS at 06:35

## 2024-02-18 RX ADMIN — IBUPROFEN 600 MG: 600 TABLET ORAL at 11:06

## 2024-02-18 RX ADMIN — IBUPROFEN 600 MG: 600 TABLET ORAL at 00:19

## 2024-02-18 RX ADMIN — ACETAMINOPHEN 975 MG: 325 TABLET ORAL at 11:06

## 2024-02-18 ASSESSMENT — PAIN SCALES - GENERAL
PAINLEVEL_OUTOF10: 2
PAINLEVEL_OUTOF10: 3
PAINLEVEL_OUTOF10: 5 - MODERATE PAIN
PAINLEVEL_OUTOF10: 7
PAINLEVEL_OUTOF10: 0 - NO PAIN
PAINLEVEL_OUTOF10: 7

## 2024-02-18 ASSESSMENT — PAIN DESCRIPTION - LOCATION
LOCATION: ABDOMEN
LOCATION: HEAD
LOCATION: HEAD

## 2024-02-18 ASSESSMENT — PAIN - FUNCTIONAL ASSESSMENT
PAIN_FUNCTIONAL_ASSESSMENT: 0-10

## 2024-02-18 ASSESSMENT — PAIN DESCRIPTION - ORIENTATION: ORIENTATION: LOWER

## 2024-02-18 NOTE — CARE PLAN
"The patient's goals for the shift include \"have good pressures and make sure it is safe to go home\"    The clinical goals for the shift include BP <160/110      "

## 2024-02-18 NOTE — DISCHARGE INSTRUCTIONS
Any woman can have complications after a birth including a blood clot, a heart problem, hypertensive disorder/eclampsia, depression, hemorrhage, or infection. Notify all providers of your delivery date up to one year after birth.*       Call 911 or go to nearest emergency room right away if you have:   PAIN or pressure in chest;   OBSTRUCTED breathing or shortness of breath;   SEIZURES;   THOUGHTS of hurting yourself or someone else; heart palpitations/racing; change in alertness/confusion.    Call your provider if you have:   BLEEDING, soaking through a pad/hour, or blood clots the size of an egg or bigger;   INCISION (episiotomy stitches or  site) that is not healing (increased redness, pain, drainage/pus, or separation) if you had one;   RED or swollen leg/calf that is painful or warm to touch, especially in one leg more than the other;   TEMPERATURE of 100.4 F or higher or chills;   HEADACHE that does not get better with medicine, rest or hydration, or bad headache with vision changes   like spots or flashing lights; increased swelling of face, hands or legs; severe cramps or upper right belly pain; red or swollen breast that is painful or warm to touch; an unusual, foul odor from your vaginal discharge; pain, burning, or difficulty during urination; severe constipation (more than 5 days); feelings of depression (such as depressed mood, loss of interest in enjoyable things, unable to care for yourself, trouble sleeping, lack of appetite, or feeling worthless).     If you can't reach your provider or symptoms worsen, call 911 or go to nearest emergency room.   *Information obtained from [facility]: Save Your Life: Get Care for These POST-BIRTH Warning Signs

## 2024-02-18 NOTE — DISCHARGE SUMMARY
Discharge Summary    Admission Date: 2024  Discharge Date: 2024     Discharge Diagnosis  Preeclampsia, third trimester    Hospital Course  Delivery Date: 2024  4:56 AM   Delivery type: , Low Transverse    GA at delivery: 35w2d  Outcome: Living   Anesthesia during delivery: Spinal   Intrapartum complications: None   Feeding method: Breastfeeding Status: Yes       Procedures:   delivery   Contraception at discharge: none    Postpartum Course:  - Pain controlled on PO meds  - Hgb: 10.7  - sPEC: HELLP labs neg, normotensive and asx on Nifed 60 BID + Labetalol 800 TID, bp cuff and follow up arranged     Patient seen on day of DC. Continuing to meet all PP milestones. All questions/concerns addressed. She is medically stable and feels comfortable going home today w/ follow up as below.  - The signs and symptoms of PEC were reviewed with the patient, including unrelenting headache, vision changes/blurred vision, and pain underneath the right breast.   - BP cuff for home for checking BP BID. Pt instructed to call primary OB if SBP > 160 or DBP > 110.     ELISSA Alicea/Robi    Pertinent Physical Exam At Time of Discharge  General: Examination reveals a well developed, well nourished, female, in no acute distress. She is alert and cooperative.  Abdomen: soft, non-distended, non-tender to palpation.  Fundus: firm, below umbilicus, and nontender.  Psychological: awake and alert; oriented to person, place, and time.  Skin: incision clean, dry, intact, well approximated, no redness, drainage, or warmth      Discharge Meds     Your medication list        START taking these medications        Instructions Last Dose Given Next Dose Due   acetaminophen 325 mg tablet  Commonly known as: Tylenol      Take 3 tablets (975 mg) by mouth every 6 hours for 10 days.       ibuprofen 600 mg tablet      Take 1 tablet (600 mg) by mouth every 6 hours for 10 days.       labetalol 200 mg  tablet  Commonly known as: Normodyne      Take 4 tablets (800 mg) by mouth every 8 hours.       NIFEdipine ER 60 mg 24 hr tablet  Commonly known as: Adalat CC      Take 1 tablet (60 mg) by mouth every 12 hours. Do not crush, chew, or split.       polyethylene glycol 17 gram packet  Commonly known as: Glycolax, Miralax      Take 17 g by mouth 2 times a day for 5 days.              CONTINUE taking these medications        Instructions Last Dose Given Next Dose Due   PRENATAL 19 ORAL                     Where to Get Your Medications        These medications were sent to [facility]/pharmacy #[id number] - [place], [place] - [address] AT [place] [address] [address]  [address], [facility] [address]      Phone: [phone]   acetaminophen 325 mg tablet  ibuprofen 600 mg tablet  labetalol 200 mg tablet  NIFEdipine ER 60 mg 24 hr tablet  polyethylene glycol 17 gram packet          Complications Requiring Follow-Up  Follow up in 2-5 days for BP check with your OB provider., Follow up in 2 wks for incision check with your OB provider., and Follow up in 4-6 wk for postpartum visit with your OB provider.     Test Results Pending At Discharge  Pending Labs       No current pending labs.            Outpatient Follow-Up  No future appointments.    I spent 20 minutes in the professional and overall care of this patient.      Marilee Caicedo PA-C

## 2024-02-19 ENCOUNTER — TELEPHONE (OUTPATIENT)
Dept: OBSTETRICS AND GYNECOLOGY | Facility: CLINIC | Age: 34
End: 2024-02-19

## 2024-02-19 ENCOUNTER — APPOINTMENT (OUTPATIENT)
Dept: OBSTETRICS AND GYNECOLOGY | Facility: CLINIC | Age: 34
End: 2024-02-19
Payer: COMMERCIAL

## 2024-02-20 ENCOUNTER — HOSPITAL ENCOUNTER (OUTPATIENT)
Facility: HOSPITAL | Age: 34
Setting detail: OBSERVATION
Discharge: HOME | DRG: 776 | End: 2024-02-21
Attending: OBSTETRICS & GYNECOLOGY | Admitting: OBSTETRICS & GYNECOLOGY
Payer: COMMERCIAL

## 2024-02-20 ENCOUNTER — HOSPITAL ENCOUNTER (EMERGENCY)
Facility: HOSPITAL | Age: 34
Discharge: STILL ADMITTED OR ADMINISTRATIVE | DRG: 776 | End: 2024-02-20
Payer: COMMERCIAL

## 2024-02-20 ENCOUNTER — LACTATION ENCOUNTER (OUTPATIENT)
Dept: OTHER | Facility: HOSPITAL | Age: 34
End: 2024-02-20

## 2024-02-20 DIAGNOSIS — O14.13 PREECLAMPSIA, SEVERE, THIRD TRIMESTER (HHS-HCC): ICD-10-CM

## 2024-02-20 LAB
ALBUMIN SERPL BCP-MCNC: 3.6 G/DL (ref 3.4–5)
ALP SERPL-CCNC: 61 U/L (ref 33–110)
ALT SERPL W P-5'-P-CCNC: 24 U/L (ref 7–45)
ANION GAP SERPL CALC-SCNC: 17 MMOL/L (ref 10–20)
AST SERPL W P-5'-P-CCNC: 24 U/L (ref 9–39)
BILIRUB SERPL-MCNC: 0.3 MG/DL (ref 0–1.2)
BUN SERPL-MCNC: 14 MG/DL (ref 6–23)
CALCIUM SERPL-MCNC: 8.3 MG/DL (ref 8.6–10.3)
CHLORIDE SERPL-SCNC: 90 MMOL/L (ref 98–107)
CO2 SERPL-SCNC: 23 MMOL/L (ref 21–32)
CREAT SERPL-MCNC: 0.64 MG/DL (ref 0.5–1.05)
EGFRCR SERPLBLD CKD-EPI 2021: >90 ML/MIN/1.73M*2
ERYTHROCYTE [DISTWIDTH] IN BLOOD BY AUTOMATED COUNT: 12.7 % (ref 11.5–14.5)
GLUCOSE SERPL-MCNC: 88 MG/DL (ref 74–99)
HCT VFR BLD AUTO: 32.3 % (ref 36–46)
HGB BLD-MCNC: 11.6 G/DL (ref 12–16)
LDH SERPL L TO P-CCNC: 179 U/L (ref 84–246)
MCH RBC QN AUTO: 32.6 PG (ref 26–34)
MCHC RBC AUTO-ENTMCNC: 35.9 G/DL (ref 32–36)
MCV RBC AUTO: 91 FL (ref 80–100)
NRBC BLD-RTO: 0 /100 WBCS (ref 0–0)
PLATELET # BLD AUTO: 266 X10*3/UL (ref 150–450)
POTASSIUM SERPL-SCNC: 3.6 MMOL/L (ref 3.5–5.3)
PROT SERPL-MCNC: 6.4 G/DL (ref 6.4–8.2)
RBC # BLD AUTO: 3.56 X10*6/UL (ref 4–5.2)
SODIUM SERPL-SCNC: 126 MMOL/L (ref 136–145)
WBC # BLD AUTO: 8.2 X10*3/UL (ref 4.4–11.3)

## 2024-02-20 PROCEDURE — 2500000004 HC RX 250 GENERAL PHARMACY W/ HCPCS (ALT 636 FOR OP/ED): Performed by: OBSTETRICS & GYNECOLOGY

## 2024-02-20 PROCEDURE — G0378 HOSPITAL OBSERVATION PER HR: HCPCS

## 2024-02-20 PROCEDURE — 85027 COMPLETE CBC AUTOMATED: CPT | Performed by: OBSTETRICS & GYNECOLOGY

## 2024-02-20 PROCEDURE — 83615 LACTATE (LD) (LDH) ENZYME: CPT | Performed by: OBSTETRICS & GYNECOLOGY

## 2024-02-20 PROCEDURE — 2500000002 HC RX 250 W HCPCS SELF ADMINISTERED DRUGS (ALT 637 FOR MEDICARE OP, ALT 636 FOR OP/ED): Performed by: OBSTETRICS & GYNECOLOGY

## 2024-02-20 PROCEDURE — 99285 EMERGENCY DEPT VISIT HI MDM: CPT | Mod: 25

## 2024-02-20 PROCEDURE — 96374 THER/PROPH/DIAG INJ IV PUSH: CPT

## 2024-02-20 PROCEDURE — 1120000001 HC OB PRIVATE ROOM DAILY

## 2024-02-20 PROCEDURE — 36415 COLL VENOUS BLD VENIPUNCTURE: CPT | Performed by: OBSTETRICS & GYNECOLOGY

## 2024-02-20 PROCEDURE — 80053 COMPREHEN METABOLIC PANEL: CPT | Performed by: OBSTETRICS & GYNECOLOGY

## 2024-02-20 RX ORDER — HYDRALAZINE HYDROCHLORIDE 20 MG/ML
5 INJECTION INTRAMUSCULAR; INTRAVENOUS ONCE AS NEEDED
Status: COMPLETED | OUTPATIENT
Start: 2024-02-20 | End: 2024-02-20

## 2024-02-20 RX ORDER — NIFEDIPINE 30 MG/1
30 TABLET, FILM COATED, EXTENDED RELEASE ORAL ONCE
Status: COMPLETED | OUTPATIENT
Start: 2024-02-20 | End: 2024-02-20

## 2024-02-20 RX ORDER — ONDANSETRON HYDROCHLORIDE 2 MG/ML
4 INJECTION, SOLUTION INTRAVENOUS EVERY 6 HOURS PRN
Status: DISCONTINUED | OUTPATIENT
Start: 2024-02-20 | End: 2024-02-21 | Stop reason: HOSPADM

## 2024-02-20 RX ORDER — OXYTOCIN 10 [USP'U]/ML
10 INJECTION, SOLUTION INTRAMUSCULAR; INTRAVENOUS ONCE AS NEEDED
Status: DISCONTINUED | OUTPATIENT
Start: 2024-02-20 | End: 2024-02-21 | Stop reason: HOSPADM

## 2024-02-20 RX ORDER — CARBOPROST TROMETHAMINE 250 UG/ML
250 INJECTION, SOLUTION INTRAMUSCULAR ONCE AS NEEDED
Status: DISCONTINUED | OUTPATIENT
Start: 2024-02-20 | End: 2024-02-21 | Stop reason: HOSPADM

## 2024-02-20 RX ORDER — DIPHENHYDRAMINE HYDROCHLORIDE 50 MG/ML
50 INJECTION INTRAMUSCULAR; INTRAVENOUS EVERY 6 HOURS PRN
Status: DISCONTINUED | OUTPATIENT
Start: 2024-02-20 | End: 2024-02-21 | Stop reason: HOSPADM

## 2024-02-20 RX ORDER — ENOXAPARIN SODIUM 100 MG/ML
40 INJECTION SUBCUTANEOUS EVERY 24 HOURS
Status: DISCONTINUED | OUTPATIENT
Start: 2024-02-21 | End: 2024-02-21 | Stop reason: HOSPADM

## 2024-02-20 RX ORDER — NIFEDIPINE 10 MG/1
10 CAPSULE ORAL ONCE AS NEEDED
Status: DISCONTINUED | OUTPATIENT
Start: 2024-02-20 | End: 2024-02-21 | Stop reason: HOSPADM

## 2024-02-20 RX ORDER — ACETAMINOPHEN 325 MG/1
650 TABLET ORAL EVERY 4 HOURS PRN
Status: DISCONTINUED | OUTPATIENT
Start: 2024-02-20 | End: 2024-02-21 | Stop reason: HOSPADM

## 2024-02-20 RX ORDER — METOCLOPRAMIDE 10 MG/1
10 TABLET ORAL EVERY 6 HOURS PRN
Status: DISCONTINUED | OUTPATIENT
Start: 2024-02-20 | End: 2024-02-21 | Stop reason: HOSPADM

## 2024-02-20 RX ORDER — POLYETHYLENE GLYCOL 3350 17 G/17G
17 POWDER, FOR SOLUTION ORAL 2 TIMES DAILY PRN
Status: DISCONTINUED | OUTPATIENT
Start: 2024-02-20 | End: 2024-02-21 | Stop reason: HOSPADM

## 2024-02-20 RX ORDER — ADHESIVE BANDAGE
10 BANDAGE TOPICAL
Status: DISCONTINUED | OUTPATIENT
Start: 2024-02-20 | End: 2024-02-21 | Stop reason: HOSPADM

## 2024-02-20 RX ORDER — TRANEXAMIC ACID 100 MG/ML
1000 INJECTION, SOLUTION INTRAVENOUS ONCE AS NEEDED
Status: DISCONTINUED | OUTPATIENT
Start: 2024-02-20 | End: 2024-02-21 | Stop reason: HOSPADM

## 2024-02-20 RX ORDER — ONDANSETRON 4 MG/1
4 TABLET, FILM COATED ORAL EVERY 6 HOURS PRN
Status: DISCONTINUED | OUTPATIENT
Start: 2024-02-20 | End: 2024-02-21 | Stop reason: HOSPADM

## 2024-02-20 RX ORDER — LOPERAMIDE HYDROCHLORIDE 2 MG/1
4 CAPSULE ORAL EVERY 2 HOUR PRN
Status: DISCONTINUED | OUTPATIENT
Start: 2024-02-20 | End: 2024-02-21 | Stop reason: HOSPADM

## 2024-02-20 RX ORDER — ACETAMINOPHEN 650 MG/1
650 SUPPOSITORY RECTAL EVERY 4 HOURS PRN
Status: DISCONTINUED | OUTPATIENT
Start: 2024-02-20 | End: 2024-02-21 | Stop reason: HOSPADM

## 2024-02-20 RX ORDER — BISACODYL 10 MG/1
10 SUPPOSITORY RECTAL DAILY PRN
Status: DISCONTINUED | OUTPATIENT
Start: 2024-02-20 | End: 2024-02-21 | Stop reason: HOSPADM

## 2024-02-20 RX ORDER — ACETAMINOPHEN 160 MG/5ML
650 SOLUTION ORAL EVERY 4 HOURS PRN
Status: DISCONTINUED | OUTPATIENT
Start: 2024-02-20 | End: 2024-02-21 | Stop reason: HOSPADM

## 2024-02-20 RX ORDER — METOCLOPRAMIDE HYDROCHLORIDE 5 MG/ML
10 INJECTION INTRAMUSCULAR; INTRAVENOUS EVERY 6 HOURS PRN
Status: DISCONTINUED | OUTPATIENT
Start: 2024-02-20 | End: 2024-02-21 | Stop reason: HOSPADM

## 2024-02-20 RX ORDER — METHYLERGONOVINE MALEATE 0.2 MG/ML
0.2 INJECTION INTRAVENOUS ONCE AS NEEDED
Status: DISCONTINUED | OUTPATIENT
Start: 2024-02-20 | End: 2024-02-21 | Stop reason: HOSPADM

## 2024-02-20 RX ORDER — LABETALOL HYDROCHLORIDE 5 MG/ML
20 INJECTION, SOLUTION INTRAVENOUS ONCE AS NEEDED
Status: DISCONTINUED | OUTPATIENT
Start: 2024-02-20 | End: 2024-02-21 | Stop reason: HOSPADM

## 2024-02-20 RX ORDER — MISOPROSTOL 200 UG/1
800 TABLET ORAL ONCE AS NEEDED
Status: DISCONTINUED | OUTPATIENT
Start: 2024-02-20 | End: 2024-02-21 | Stop reason: HOSPADM

## 2024-02-20 RX ORDER — NIFEDIPINE 30 MG/1
60 TABLET, FILM COATED, EXTENDED RELEASE ORAL EVERY 24 HOURS
Status: DISCONTINUED | OUTPATIENT
Start: 2024-02-20 | End: 2024-02-20

## 2024-02-20 RX ORDER — SODIUM CHLORIDE, SODIUM LACTATE, POTASSIUM CHLORIDE, CALCIUM CHLORIDE 600; 310; 30; 20 MG/100ML; MG/100ML; MG/100ML; MG/100ML
125 INJECTION, SOLUTION INTRAVENOUS CONTINUOUS
Status: DISCONTINUED | OUTPATIENT
Start: 2024-02-20 | End: 2024-02-21 | Stop reason: HOSPADM

## 2024-02-20 RX ORDER — SIMETHICONE 80 MG
80 TABLET,CHEWABLE ORAL 4 TIMES DAILY PRN
Status: DISCONTINUED | OUTPATIENT
Start: 2024-02-20 | End: 2024-02-21 | Stop reason: HOSPADM

## 2024-02-20 RX ADMIN — HYDRALAZINE HYDROCHLORIDE 5 MG: 20 INJECTION INTRAMUSCULAR; INTRAVENOUS at 22:08

## 2024-02-20 RX ADMIN — ACETAMINOPHEN 650 MG: 325 TABLET ORAL at 21:28

## 2024-02-20 RX ADMIN — NIFEDIPINE 30 MG: 30 TABLET, FILM COATED, EXTENDED RELEASE ORAL at 22:08

## 2024-02-20 RX ADMIN — NIFEDIPINE 60 MG: 30 TABLET, FILM COATED, EXTENDED RELEASE ORAL at 20:56

## 2024-02-20 RX ADMIN — NIFEDIPINE 30 MG: 30 TABLET, FILM COATED, EXTENDED RELEASE ORAL at 23:08

## 2024-02-20 ASSESSMENT — PAIN SCALES - GENERAL: PAINLEVEL_OUTOF10: 3

## 2024-02-20 NOTE — LACTATION NOTE
This note was copied from a baby's chart.  Lactation Consultant Note  Lactation Consultation     Maternal-Infant separation r/t NICU admission.    Maternal Information   Mom  4 previous children without difficulty for upto 16 months.    Breast Pump   Mom reports she has breastpump for home use.  Recommendations/Summary  Met with Mom. Explained availability of [facility] LC services. Instructed on listed patient education, CHANELL, Benefits of mother's own milk for  infant, breast massage & hand expression, CDC pump cleaning & sanitizing guidelines.  Invited to contact LC services as needed. Mom reports she pumps every couple of hours & collects ~ 4 oz with each effort. Mom indicates she has nursed baby in the NICU and hopes to transition to exclusive breastfeeding after discharge.   Provided with Breastfeeding discharge information:  Lactation Center Information & [facility] Breastfeeding Hotline & resource numbers.  Discussed Baby should nurse a minimum of 8-12 times in 24 hours (every 2-3 hours). Wake a sleepy baby every 3 hours to feed, even during the night. The more often you nurse, the more milk your body will produce. Burp your baby when switching sides and at the end of a feeding. expect 6-8 diapers per day by day 7 of age  & 2-3 bowel movements per day.

## 2024-02-21 ENCOUNTER — APPOINTMENT (OUTPATIENT)
Dept: OBSTETRICS AND GYNECOLOGY | Facility: CLINIC | Age: 34
End: 2024-02-21
Payer: COMMERCIAL

## 2024-02-21 VITALS
HEART RATE: 87 BPM | OXYGEN SATURATION: 98 % | TEMPERATURE: 97.3 F | DIASTOLIC BLOOD PRESSURE: 78 MMHG | RESPIRATION RATE: 18 BRPM | SYSTOLIC BLOOD PRESSURE: 129 MMHG

## 2024-02-21 LAB
ALBUMIN SERPL BCP-MCNC: 3.5 G/DL (ref 3.4–5)
ALBUMIN SERPL BCP-MCNC: 3.6 G/DL (ref 3.4–5)
ALP SERPL-CCNC: 60 U/L (ref 33–110)
ALP SERPL-CCNC: 61 U/L (ref 33–110)
ALT SERPL W P-5'-P-CCNC: 23 U/L (ref 7–45)
ALT SERPL W P-5'-P-CCNC: 23 U/L (ref 7–45)
ANION GAP SERPL CALC-SCNC: 13 MMOL/L (ref 10–20)
ANION GAP SERPL CALC-SCNC: 14 MMOL/L (ref 10–20)
AST SERPL W P-5'-P-CCNC: 21 U/L (ref 9–39)
AST SERPL W P-5'-P-CCNC: 22 U/L (ref 9–39)
BILIRUB SERPL-MCNC: 0.4 MG/DL (ref 0–1.2)
BILIRUB SERPL-MCNC: 0.5 MG/DL (ref 0–1.2)
BUN SERPL-MCNC: 12 MG/DL (ref 6–23)
BUN SERPL-MCNC: 13 MG/DL (ref 6–23)
CALCIUM SERPL-MCNC: 8.7 MG/DL (ref 8.6–10.3)
CALCIUM SERPL-MCNC: 8.7 MG/DL (ref 8.6–10.3)
CHLORIDE SERPL-SCNC: 96 MMOL/L (ref 98–107)
CHLORIDE SERPL-SCNC: 99 MMOL/L (ref 98–107)
CO2 SERPL-SCNC: 23 MMOL/L (ref 21–32)
CO2 SERPL-SCNC: 26 MMOL/L (ref 21–32)
CREAT SERPL-MCNC: 0.56 MG/DL (ref 0.5–1.05)
CREAT SERPL-MCNC: 0.6 MG/DL (ref 0.5–1.05)
EGFRCR SERPLBLD CKD-EPI 2021: >90 ML/MIN/1.73M*2
EGFRCR SERPLBLD CKD-EPI 2021: >90 ML/MIN/1.73M*2
GLUCOSE SERPL-MCNC: 81 MG/DL (ref 74–99)
GLUCOSE SERPL-MCNC: 93 MG/DL (ref 74–99)
POTASSIUM SERPL-SCNC: 3.4 MMOL/L (ref 3.5–5.3)
POTASSIUM SERPL-SCNC: 3.9 MMOL/L (ref 3.5–5.3)
PROT SERPL-MCNC: 6.3 G/DL (ref 6.4–8.2)
PROT SERPL-MCNC: 6.4 G/DL (ref 6.4–8.2)
SODIUM SERPL-SCNC: 129 MMOL/L (ref 136–145)
SODIUM SERPL-SCNC: 135 MMOL/L (ref 136–145)

## 2024-02-21 PROCEDURE — 80053 COMPREHEN METABOLIC PANEL: CPT | Performed by: OBSTETRICS & GYNECOLOGY

## 2024-02-21 PROCEDURE — G0378 HOSPITAL OBSERVATION PER HR: HCPCS

## 2024-02-21 PROCEDURE — 2500000004 HC RX 250 GENERAL PHARMACY W/ HCPCS (ALT 636 FOR OP/ED): Performed by: OBSTETRICS & GYNECOLOGY

## 2024-02-21 PROCEDURE — 36415 COLL VENOUS BLD VENIPUNCTURE: CPT | Performed by: OBSTETRICS & GYNECOLOGY

## 2024-02-21 PROCEDURE — 99239 HOSP IP/OBS DSCHRG MGMT >30: CPT | Performed by: OBSTETRICS & GYNECOLOGY

## 2024-02-21 RX ORDER — MAGNESIUM SULFATE HEPTAHYDRATE 500 MG/ML
INJECTION, SOLUTION INTRAMUSCULAR; INTRAVENOUS
Status: DISCONTINUED
Start: 2024-02-21 | End: 2024-02-21 | Stop reason: WASHOUT

## 2024-02-21 RX ORDER — NIFEDIPINE 60 MG/1
120 TABLET, FILM COATED, EXTENDED RELEASE ORAL DAILY
Qty: 60 TABLET | Status: ON HOLD | COMMUNITY
Start: 2024-02-21 | End: 2024-02-23 | Stop reason: SDUPTHER

## 2024-02-21 RX ADMIN — ACETAMINOPHEN 650 MG: 325 TABLET ORAL at 06:02

## 2024-02-21 RX ADMIN — ENOXAPARIN SODIUM 40 MG: 40 INJECTION SUBCUTANEOUS at 11:19

## 2024-02-21 RX ADMIN — ACETAMINOPHEN 650 MG: 325 TABLET ORAL at 11:22

## 2024-02-21 ASSESSMENT — PAIN SCALES - GENERAL
PAINLEVEL_OUTOF10: 3
PAINLEVEL_OUTOF10: 3
PAINLEVEL_OUTOF10: 0 - NO PAIN

## 2024-02-21 NOTE — DISCHARGE SUMMARY
Discharge Summary    Admission Date: 2024  Discharge Date: 2024    Discharge Diagnosis  Hypertension in pregnancy, pre-eclampsia, severe, postpartum condition    Hospital Course  Delivery Date: 2024  4:56 AM   Delivery type: , Low Transverse    GA at delivery: 35w2d  Outcome: Living   Anesthesia during delivery: Spinal   Intrapartum complications: None   Feeding method:      34 yo  s/p LTCS 2024, preeclampsia with severe features, readmitted with severe range BP's. Mag sulfate x 24 hours. BP managed with first with increasing doses of nifedipine ER, 60 mg then labetalol added. [place] [facility] [place] 60 mg and labetalol 800 mg tid.   Home BP monitoring.   Stopped nifedipine because got an itchy rash on abdomen. BP increased, readmitted 2024. Given a total of nifedipine ER 120mg and IV hydralazine to control BP.   Discharged home on nifedipine ER 120mg to be taken 9pm daily.   Resume home BP monitoring.  Pt to call if BP's increasing. Will add labetalol back to regimen if needed.      Discharge Meds     Your medication list        ASK your doctor about these medications        Instructions Last Dose Given Next Dose Due   acetaminophen 325 mg tablet  Commonly known as: Tylenol      Take 3 tablets (975 mg) by mouth every 6 hours for 10 days.       ibuprofen 600 mg tablet      Take 1 tablet (600 mg) by mouth every 6 hours for 10 days.       labetalol 200 mg tablet  Commonly known as: Normodyne      Take 4 tablets (800 mg) by mouth every 8 hours.       NIFEdipine ER 60 mg 24 hr tablet  Commonly known as: Adalat CC      Take 1 tablet (60 mg) by mouth every 12 hours. Do not crush, chew, or split.       polyethylene glycol 17 gram packet  Commonly known as: Glycolax, Miralax      Take 17 g by mouth 2 times a day for 5 days.       PRENATAL 19 ORAL                     Test Results Pending At Discharge  Pending Labs       No current pending labs.            Outpatient  Follow-Up  No future appointments.    I spent 30 minutes in the professional and overall care of this patient.      Audra Monsivais MD

## 2024-02-21 NOTE — PROGRESS NOTES
Pt now one hour s/p nifedipine dosing  30 mg nifedipine ordered  Pt evaluated  She just got up to the bathroom and started to have a HA again  BP at one hour s/p nifedipine XL initial dose remains severe range, now with return of HOFFMANN.  BP (!) 186/120   Pulse 87   Temp 36.7 °C (98.1 °F) (Oral)   Resp 18   LMP 06/11/2023   SpO2 98%    Will treat with IV hydralazine 5mg now and continue uptitration of PO meds

## 2024-02-21 NOTE — H&P
Obstetrical Admission History and Physical     Christine Navarro is a 33 y.o.  who delivered 2024  by , Low Transverse  at 35w2d and is now PPD 7 s/p RLTCS at 35 wks in the setting of   S/p magnesium sulfate  D/xochilt home on labetalol 800mg TID and nifedipine 60 BID. Pt reports difficult to control BP and said they had a hard time finding a regimen that she was controlled enough for homegoing. She was discharged home on POD#5 on 24.  Pt reports that she had a rash that started in hospital. It was noticed on her back when the RN took the lidocaine patch off. It spread to her abdomen and up to her face.  The decision was made to discontinue the nifedipine as a possible source. Rash is improving.  Did not take her pm dose of nifedipine yesterday. Remained on her labetalol. She felt fine today. She then started getting a swelling feeling in the skin of her hands that moved up her arms to her chest/back and head. She had a mild HA. Took her BP and it was 180 systolic so she came to triage.   Since being in triage, she is feeling better, feeling almost back to normal. HOFFMANN improved.      Chief Complaint: No chief complaint on file.    Assessment/Plan    Known severe PEC s/p magnesium sulfate d/xochilt home on labetalol 800mg TID and nifedipine 60mg BID  Severe BP postpartum BP recurred after she d/xochilt nifedipine in the setting of rash  Discussed her exposure to many new medications while in hospital, difficult to assess if her rash was due to nifedipine or various other sources. Rash started on her back near lidocaine patch.   Discussed need for BP control and difficulty finding regimen. Pt reports her Bp finally started coming down with nifedipine.  Discussed option to restart nifedipine and monitor for si/sx of allergic reaction. It is possible that allergic reaction recurs and may even progress. It also may not recur at all if nifedipine was not the cause. Pt and FOB agreeable to try given good BP  control with nifedipine.   Will start with nifedipine 60mg now. Will uptitrate by 30mg after as needed and if tolerated  Insert IV  Benadryl mg 50mg IV ordered prn rash/itching  HELLP labs now  Lovenox ppx    Principal Problem:    Hypertension in pregnancy, pre-eclampsia, severe, postpartum condition      Pregnancy Problems (from 21 to present)       Problem Noted Resolved    Hypertension in pregnancy, pre-eclampsia, severe, postpartum condition 2024 by Wale Alonzo MD No    Priority:  Medium      Preeclampsia, severe, third trimester 2024 by Renee Hill MD No    Priority:  Medium      Preeclampsia, third trimester 2024 by Christine Etienne MD No    Priority:  Medium      Gestational hypertension, third trimester 2024 by Leatha Capone MD No    Priority:  Medium      32 weeks gestation of pregnancy 2024 by Leatha Capone MD No    Priority:  Medium      26 weeks gestation of pregnancy 12/15/2023 by Leatha Capone MD No    Priority:  Medium            Subjective   Christine is here complaining of hypertension   Hypertension symptoms: Headache    The patient is being admitted for further evaluation and monitoring.        Obstetrical History   OB History    Para Term  AB Living   5 5 4 1 0 5   SAB IAB Ectopic Multiple Live Births   0 0 0 0 5      # Outcome Date GA Lbr Thiago/2nd Weight Sex Delivery Anes PTL Lv   5  24 35w2d  2.76 kg F CS-LTranv Spinal  ALVA   4 Term 20 38w3d  3.402 kg F CS-LTranv Spinal N ALVA      Complications: Gestational hypertension   3 Term 16 38w2d  3.544 kg M Vag-Spont None N ALVA   2 Term 10/22/13 39w0d  3.515 kg F Vag-Spont None N ALVA   1 Term 12 39w0d  3.374 kg M Vag-Spont None N ALVA      Complications: Low-lying placenta, Preeclampsia       Past Medical History  Past Medical History:   Diagnosis Date    Encounter for routine postpartum follow-up 2021    Postpartum exam    Encounter for supervision of normal  pregnancy, unspecified, second trimester 2016    Encounter for pregnancy related examination in second trimester    Gestational hypertension     History of pre-eclampsia     Seasonal allergies     Umbilical hernia     Unspecified abdominal hernia without obstruction or gangrene     Hernia    Varicella     Varicose veins during pregnancy         Past Surgical History   Past Surgical History:   Procedure Laterality Date     SECTION, LOW TRANSVERSE         Social History  Social History     Tobacco Use    Smoking status: Never    Smokeless tobacco: Never   Substance Use Topics    Alcohol use: Not Currently     Substance and Sexual Activity   Drug Use Never       Allergies  Patient has no known allergies.     Medications  Medications Prior to Admission   Medication Sig Dispense Refill Last Dose    acetaminophen (Tylenol) 325 mg tablet Take 3 tablets (975 mg) by mouth every 6 hours for 10 days. 120 tablet 0     ibuprofen 600 mg tablet Take 1 tablet (600 mg) by mouth every 6 hours for 10 days. 40 tablet 0     labetalol (Normodyne) 200 mg tablet Take 4 tablets (800 mg) by mouth every 8 hours. 360 tablet 0     NIFEdipine ER (Adalat CC) 60 mg 24 hr tablet Take 1 tablet (60 mg) by mouth every 12 hours. Do not crush, chew, or split. 60 tablet 0     polyethylene glycol (Glycolax, Miralax) 17 gram packet Take 17 g by mouth 2 times a day for 5 days. 10 packet 0     prenatal no115/iron/folic acid (PRENATAL 19 ORAL) Take by mouth.          Objective    Last Vitals  Temp Pulse Resp BP MAP O2 Sat                   Physical Examination  Physical Exam  Constitutional:       Appearance: Normal appearance.   HENT:      Head: Normocephalic and atraumatic.   Eyes:      Extraocular Movements: Extraocular movements intact.      Conjunctiva/sclera: Conjunctivae normal.      Pupils: Pupils are equal, round, and reactive to light.   Cardiovascular:      Rate and Rhythm: Normal rate and regular rhythm.   Pulmonary:      Effort:  Pulmonary effort is normal.      Breath sounds: Normal breath sounds.   Skin:     General: Skin is dry.   Neurological:      General: No focal deficit present.      Mental Status: She is alert.   Psychiatric:         Mood and Affect: Mood normal.         Behavior: Behavior normal.         Thought Content: Thought content normal.         Judgment: Judgment normal.

## 2024-02-21 NOTE — PROGRESS NOTES
Postpartum Progress Note    Assessment/Plan   Christine Navarro is a 33 y.o.,  who delivered at 35w2d via c/s at [facility], preeclampsia with severe features, readmitted for severe range BP's. Now postpartum day 8.  Plan to continue Nifedipine  mg to be taken at 9pm tonight.   Will monitor BP today and if BP remains controlled then discharge home later today on Nifedipine  mg only.   Pt doing home BP monitoring and reviewed with pt that we can add back labetalol if BP increases after discharge.     Principal Problem:    Hypertension in pregnancy, pre-eclampsia, severe, postpartum condition      Subjective   Pt reports feeling very well.   No headache, no blurred vision.     Reports her last dose of labetalol 800 mg tid was 2024 1600 hours.   Nifedipine  mg total between 9-11pm.     Pt reports rash on abdomen has returned. She states the rash resolved when she stopped Nifedipine this past week. Is only on the abdomen. Slight itching. No SOB or other sx. Unclear if rash related to nifedipine. Since BP well controlled on current dose of nifedipine, benefits outweigh the risks, will continue nifedipine and pt given warnings to watch for.     Objective   Allergies:   Patient has no known allergies.    Last Vitals:  Temp Pulse Resp BP MAP Pulse Ox   36.3 °C (97.3 °F) 87 18 129/78   98 %     Vitals Min/Max Last 24 Hours:  Temp  Min: 36.3 °C (97.3 °F)  Max: 36.7 °C (98.1 °F)  Pulse  Min: 86  Max: 90  Resp  Min: 18  Max: 18  BP  Min: 115/63  Max: 186/120    In last 12 hours, SPB max 146, DBP max 91.       Physical Exam:  General: no acute distress; she is awake and alert  Lungs: CTA  CV:  RRR  Abdomen: soft, non-tender, no distention  Incision: well approx. Steristrips removed.  Fundus: firm  Extremities: no edema, no erythema, normal movements  Psychological: appropriate mood and behavior     Audra Monsivais MD

## 2024-02-21 NOTE — LACTATION NOTE
Lactation Consultant Note    Recommendations/Summary  32 y/o  experienced breastfeeding mother with admission for severe range blood pressures 1 week post partum. Per bedside RN, pump is set up at bedside and mother is pumping at this time. Per RN, mother does not need assistance at this time.

## 2024-02-21 NOTE — PROGRESS NOTES
PT feeling much better.   Reports HA peak here was 20% better than her HA at home. Now HA almost resolved.  BP improved 10 min s/p IV hydralazine dosing: /101  20 min recheck 149/98, current.  Currently with total dose nifedipine 90 mg XL: 60 mg given ~ 2100, 30mg given ~ 2200  Continue close monitoring  Will uptitrate by 30mg nifedipine to total 120 daily dose prn

## 2024-02-22 ENCOUNTER — HOSPITAL ENCOUNTER (INPATIENT)
Facility: HOSPITAL | Age: 34
LOS: 4 days | Discharge: HOME | DRG: 776 | End: 2024-02-26
Attending: OBSTETRICS & GYNECOLOGY | Admitting: OBSTETRICS & GYNECOLOGY
Payer: COMMERCIAL

## 2024-02-22 ENCOUNTER — APPOINTMENT (OUTPATIENT)
Dept: OBSTETRICS AND GYNECOLOGY | Facility: CLINIC | Age: 34
End: 2024-02-22
Payer: COMMERCIAL

## 2024-02-22 DIAGNOSIS — O14.13 PREECLAMPSIA, SEVERE, THIRD TRIMESTER (HHS-HCC): ICD-10-CM

## 2024-02-22 PROBLEM — R51.9 HEADACHE IN PREGNANCY, POSTPARTUM (HHS-HCC): Status: ACTIVE | Noted: 2024-02-22

## 2024-02-22 LAB
ALBUMIN SERPL BCP-MCNC: 4.1 G/DL (ref 3.4–5)
ALP SERPL-CCNC: 64 U/L (ref 33–110)
ALT SERPL W P-5'-P-CCNC: 26 U/L (ref 7–45)
ANION GAP SERPL CALC-SCNC: 17 MMOL/L (ref 10–20)
AST SERPL W P-5'-P-CCNC: 26 U/L (ref 9–39)
BILIRUB SERPL-MCNC: 0.5 MG/DL (ref 0–1.2)
BUN SERPL-MCNC: 15 MG/DL (ref 6–23)
CALCIUM SERPL-MCNC: 9.2 MG/DL (ref 8.6–10.3)
CHLORIDE SERPL-SCNC: 95 MMOL/L (ref 98–107)
CO2 SERPL-SCNC: 24 MMOL/L (ref 21–32)
CREAT SERPL-MCNC: 0.48 MG/DL (ref 0.5–1.05)
EGFRCR SERPLBLD CKD-EPI 2021: >90 ML/MIN/1.73M*2
ERYTHROCYTE [DISTWIDTH] IN BLOOD BY AUTOMATED COUNT: 12.8 % (ref 11.5–14.5)
GLUCOSE SERPL-MCNC: 94 MG/DL (ref 74–99)
HCT VFR BLD AUTO: 39 % (ref 36–46)
HGB BLD-MCNC: 13.6 G/DL (ref 12–16)
LDH SERPL L TO P-CCNC: 269 U/L (ref 84–246)
MCH RBC QN AUTO: 31.9 PG (ref 26–34)
MCHC RBC AUTO-ENTMCNC: 34.9 G/DL (ref 32–36)
MCV RBC AUTO: 92 FL (ref 80–100)
NRBC BLD-RTO: 0 /100 WBCS (ref 0–0)
PLATELET # BLD AUTO: 291 X10*3/UL (ref 150–450)
POTASSIUM SERPL-SCNC: 4 MMOL/L (ref 3.5–5.3)
PROT SERPL-MCNC: 7.5 G/DL (ref 6.4–8.2)
RBC # BLD AUTO: 4.26 X10*6/UL (ref 4–5.2)
SODIUM SERPL-SCNC: 132 MMOL/L (ref 136–145)
URATE SERPL-MCNC: 5.3 MG/DL (ref 2.3–6.7)
WBC # BLD AUTO: 7.7 X10*3/UL (ref 4.4–11.3)

## 2024-02-22 PROCEDURE — 2500000002 HC RX 250 W HCPCS SELF ADMINISTERED DRUGS (ALT 637 FOR MEDICARE OP, ALT 636 FOR OP/ED): Performed by: OBSTETRICS & GYNECOLOGY

## 2024-02-22 PROCEDURE — 85027 COMPLETE CBC AUTOMATED: CPT | Performed by: OBSTETRICS & GYNECOLOGY

## 2024-02-22 PROCEDURE — 2500000001 HC RX 250 WO HCPCS SELF ADMINISTERED DRUGS (ALT 637 FOR MEDICARE OP): Performed by: OBSTETRICS & GYNECOLOGY

## 2024-02-22 PROCEDURE — 36415 COLL VENOUS BLD VENIPUNCTURE: CPT | Performed by: OBSTETRICS & GYNECOLOGY

## 2024-02-22 PROCEDURE — 84155 ASSAY OF PROTEIN SERUM: CPT | Performed by: OBSTETRICS & GYNECOLOGY

## 2024-02-22 PROCEDURE — 1120000001 HC OB PRIVATE ROOM DAILY

## 2024-02-22 PROCEDURE — 84550 ASSAY OF BLOOD/URIC ACID: CPT | Performed by: OBSTETRICS & GYNECOLOGY

## 2024-02-22 PROCEDURE — 99223 1ST HOSP IP/OBS HIGH 75: CPT | Performed by: OBSTETRICS & GYNECOLOGY

## 2024-02-22 PROCEDURE — 83615 LACTATE (LD) (LDH) ENZYME: CPT | Performed by: OBSTETRICS & GYNECOLOGY

## 2024-02-22 PROCEDURE — 2500000004 HC RX 250 GENERAL PHARMACY W/ HCPCS (ALT 636 FOR OP/ED): Performed by: OBSTETRICS & GYNECOLOGY

## 2024-02-22 RX ORDER — LABETALOL 100 MG/1
400 TABLET, FILM COATED ORAL EVERY 8 HOURS
Status: DISCONTINUED | OUTPATIENT
Start: 2024-02-22 | End: 2024-02-23

## 2024-02-22 RX ORDER — METOCLOPRAMIDE HYDROCHLORIDE 5 MG/ML
10 INJECTION INTRAMUSCULAR; INTRAVENOUS EVERY 6 HOURS PRN
Status: DISCONTINUED | OUTPATIENT
Start: 2024-02-22 | End: 2024-02-26 | Stop reason: HOSPADM

## 2024-02-22 RX ORDER — LOPERAMIDE HYDROCHLORIDE 2 MG/1
4 CAPSULE ORAL EVERY 2 HOUR PRN
Status: DISCONTINUED | OUTPATIENT
Start: 2024-02-22 | End: 2024-02-26 | Stop reason: HOSPADM

## 2024-02-22 RX ORDER — NIFEDIPINE 30 MG/1
60 TABLET, FILM COATED, EXTENDED RELEASE ORAL 2 TIMES DAILY
Status: DISCONTINUED | OUTPATIENT
Start: 2024-02-22 | End: 2024-02-26 | Stop reason: HOSPADM

## 2024-02-22 RX ORDER — METHYLERGONOVINE MALEATE 0.2 MG/ML
0.2 INJECTION INTRAVENOUS ONCE AS NEEDED
Status: DISCONTINUED | OUTPATIENT
Start: 2024-02-22 | End: 2024-02-26 | Stop reason: HOSPADM

## 2024-02-22 RX ORDER — POLYETHYLENE GLYCOL 3350 17 G/17G
17 POWDER, FOR SOLUTION ORAL 2 TIMES DAILY PRN
Status: DISCONTINUED | OUTPATIENT
Start: 2024-02-22 | End: 2024-02-26 | Stop reason: HOSPADM

## 2024-02-22 RX ORDER — NIFEDIPINE 10 MG/1
10 CAPSULE ORAL ONCE AS NEEDED
Status: DISCONTINUED | OUTPATIENT
Start: 2024-02-22 | End: 2024-02-26 | Stop reason: HOSPADM

## 2024-02-22 RX ORDER — CARBOPROST TROMETHAMINE 250 UG/ML
250 INJECTION, SOLUTION INTRAMUSCULAR ONCE AS NEEDED
Status: DISCONTINUED | OUTPATIENT
Start: 2024-02-22 | End: 2024-02-26 | Stop reason: HOSPADM

## 2024-02-22 RX ORDER — ACETAMINOPHEN 325 MG/1
975 TABLET ORAL EVERY 6 HOURS PRN
Status: DISCONTINUED | OUTPATIENT
Start: 2024-02-22 | End: 2024-02-26 | Stop reason: HOSPADM

## 2024-02-22 RX ORDER — BISACODYL 10 MG/1
10 SUPPOSITORY RECTAL DAILY PRN
Status: DISCONTINUED | OUTPATIENT
Start: 2024-02-22 | End: 2024-02-26 | Stop reason: HOSPADM

## 2024-02-22 RX ORDER — LABETALOL HYDROCHLORIDE 5 MG/ML
20 INJECTION, SOLUTION INTRAVENOUS ONCE AS NEEDED
Status: COMPLETED | OUTPATIENT
Start: 2024-02-22 | End: 2024-02-23

## 2024-02-22 RX ORDER — DIPHENHYDRAMINE HCL 25 MG
CAPSULE ORAL
Status: DISPENSED
Start: 2024-02-22 | End: 2024-02-23

## 2024-02-22 RX ORDER — SODIUM CHLORIDE, SODIUM LACTATE, POTASSIUM CHLORIDE, CALCIUM CHLORIDE 600; 310; 30; 20 MG/100ML; MG/100ML; MG/100ML; MG/100ML
125 INJECTION, SOLUTION INTRAVENOUS CONTINUOUS
Status: DISCONTINUED | OUTPATIENT
Start: 2024-02-22 | End: 2024-02-26 | Stop reason: HOSPADM

## 2024-02-22 RX ORDER — DIPHENHYDRAMINE HCL 25 MG
25 CAPSULE ORAL EVERY 6 HOURS PRN
Status: DISCONTINUED | OUTPATIENT
Start: 2024-02-22 | End: 2024-02-26 | Stop reason: HOSPADM

## 2024-02-22 RX ORDER — ADHESIVE BANDAGE
10 BANDAGE TOPICAL
Status: DISCONTINUED | OUTPATIENT
Start: 2024-02-22 | End: 2024-02-26 | Stop reason: HOSPADM

## 2024-02-22 RX ORDER — OXYTOCIN 10 [USP'U]/ML
10 INJECTION, SOLUTION INTRAMUSCULAR; INTRAVENOUS ONCE AS NEEDED
Status: DISCONTINUED | OUTPATIENT
Start: 2024-02-22 | End: 2024-02-26 | Stop reason: HOSPADM

## 2024-02-22 RX ORDER — HYDRALAZINE HYDROCHLORIDE 20 MG/ML
5 INJECTION INTRAMUSCULAR; INTRAVENOUS ONCE AS NEEDED
Status: DISCONTINUED | OUTPATIENT
Start: 2024-02-22 | End: 2024-02-26 | Stop reason: HOSPADM

## 2024-02-22 RX ORDER — MISOPROSTOL 200 UG/1
800 TABLET ORAL ONCE AS NEEDED
Status: DISCONTINUED | OUTPATIENT
Start: 2024-02-22 | End: 2024-02-26 | Stop reason: HOSPADM

## 2024-02-22 RX ORDER — TRANEXAMIC ACID 100 MG/ML
1000 INJECTION, SOLUTION INTRAVENOUS ONCE AS NEEDED
Status: DISCONTINUED | OUTPATIENT
Start: 2024-02-22 | End: 2024-02-26 | Stop reason: HOSPADM

## 2024-02-22 RX ORDER — IBUPROFEN 600 MG/1
600 TABLET ORAL EVERY 6 HOURS PRN
Status: DISCONTINUED | OUTPATIENT
Start: 2024-02-22 | End: 2024-02-26 | Stop reason: HOSPADM

## 2024-02-22 RX ORDER — DIPHENHYDRAMINE HCL 25 MG
50 CAPSULE ORAL NIGHTLY PRN
Status: DISCONTINUED | OUTPATIENT
Start: 2024-02-22 | End: 2024-02-26 | Stop reason: HOSPADM

## 2024-02-22 RX ORDER — ONDANSETRON HYDROCHLORIDE 2 MG/ML
4 INJECTION, SOLUTION INTRAVENOUS EVERY 6 HOURS PRN
Status: DISCONTINUED | OUTPATIENT
Start: 2024-02-22 | End: 2024-02-26 | Stop reason: HOSPADM

## 2024-02-22 RX ORDER — SIMETHICONE 80 MG
80 TABLET,CHEWABLE ORAL 4 TIMES DAILY PRN
Status: DISCONTINUED | OUTPATIENT
Start: 2024-02-22 | End: 2024-02-26 | Stop reason: HOSPADM

## 2024-02-22 RX ORDER — ONDANSETRON 4 MG/1
4 TABLET, FILM COATED ORAL EVERY 6 HOURS PRN
Status: DISCONTINUED | OUTPATIENT
Start: 2024-02-22 | End: 2024-02-26 | Stop reason: HOSPADM

## 2024-02-22 RX ORDER — ENOXAPARIN SODIUM 100 MG/ML
40 INJECTION SUBCUTANEOUS EVERY 24 HOURS
Status: DISCONTINUED | OUTPATIENT
Start: 2024-02-22 | End: 2024-02-26 | Stop reason: HOSPADM

## 2024-02-22 RX ORDER — METOCLOPRAMIDE 10 MG/1
10 TABLET ORAL EVERY 6 HOURS PRN
Status: DISCONTINUED | OUTPATIENT
Start: 2024-02-22 | End: 2024-02-26 | Stop reason: HOSPADM

## 2024-02-22 RX ADMIN — ENOXAPARIN SODIUM 40 MG: 40 INJECTION SUBCUTANEOUS at 17:08

## 2024-02-22 RX ADMIN — POLYETHYLENE GLYCOL 3350 17 G: 17 POWDER, FOR SOLUTION ORAL at 09:55

## 2024-02-22 RX ADMIN — IBUPROFEN 600 MG: 600 TABLET, FILM COATED ORAL at 09:55

## 2024-02-22 RX ADMIN — ACETAMINOPHEN 975 MG: 325 TABLET ORAL at 05:15

## 2024-02-22 RX ADMIN — IBUPROFEN 600 MG: 600 TABLET, FILM COATED ORAL at 23:01

## 2024-02-22 RX ADMIN — LABETALOL HYDROCHLORIDE 20 MG: 5 INJECTION, SOLUTION INTRAVENOUS at 15:26

## 2024-02-22 RX ADMIN — ACETAMINOPHEN 975 MG: 325 TABLET ORAL at 23:02

## 2024-02-22 RX ADMIN — ACETAMINOPHEN 975 MG: 325 TABLET ORAL at 17:07

## 2024-02-22 RX ADMIN — DIPHENHYDRAMINE HYDROCHLORIDE 50 MG: 25 CAPSULE ORAL at 23:02

## 2024-02-22 RX ADMIN — LABETALOL HYDROCHLORIDE 400 MG: 100 TABLET, FILM COATED ORAL at 17:06

## 2024-02-22 RX ADMIN — SODIUM CHLORIDE, POTASSIUM CHLORIDE, SODIUM LACTATE AND CALCIUM CHLORIDE 250 ML: 600; 310; 30; 20 INJECTION, SOLUTION INTRAVENOUS at 06:30

## 2024-02-22 RX ADMIN — NIFEDIPINE 60 MG: 30 TABLET, FILM COATED, EXTENDED RELEASE ORAL at 19:39

## 2024-02-22 RX ADMIN — IBUPROFEN 600 MG: 600 TABLET, FILM COATED ORAL at 17:07

## 2024-02-22 RX ADMIN — METOCLOPRAMIDE 10 MG: 10 TABLET ORAL at 05:15

## 2024-02-22 RX ADMIN — ACETAMINOPHEN 975 MG: 325 TABLET ORAL at 11:32

## 2024-02-22 ASSESSMENT — PAIN SCALES - GENERAL
PAINLEVEL_OUTOF10: 7
PAINLEVEL_OUTOF10: 0 - NO PAIN

## 2024-02-22 ASSESSMENT — ACTIVITIES OF DAILY LIVING (ADL): LACK_OF_TRANSPORTATION: NO

## 2024-02-22 NOTE — CARE PLAN
Pt on and off with headache and pain throughout shift.  Adjusting medications and working with patient to achieve proper dosage for relief.

## 2024-02-22 NOTE — SIGNIFICANT EVENT
Severe range /96, 174/101.  Pt symptomatic with pounding in head.  Labetalol 20 mg IV given   141/92  Pt feeling better, resting comfortably.  Has not slept yet.  D/W Dr. Monsivais  Will reintroduce oral labetalol but given increase in nifedipine dose will start at 1/2 of homegoing labetalol dose. (400 tid)  Given shorter 1/2 life of labetalol, anticipate improved control if becomes hypotensive.  Continue close monitoring.    Given waxing and waning headache and no other neurologic sx, normal exam, will hold on sz prophylaxis at this time.

## 2024-02-22 NOTE — PROGRESS NOTES
I gave patient a 250 ml bolus of LR because her sodium was low and her H/H up. She immediately felt a lot better and her H/A improved significantly. Will hold off on MRI and have her order breakfast and coffee. Consider decreasing her Nifedipine later.

## 2024-02-22 NOTE — PROGRESS NOTES
Labs reviewed and negative.  Patient states the H/A is worse, pounding. Her whole head hurts now. Her legs feel weak although she is neurologically intact. She had not had a H/A during her pregnancy or postpartum period and she does not usually get H/As.  Last /64, 135/81 on admit.  Will get brain MRI.

## 2024-02-22 NOTE — H&P
Obstetrical Admission History and Physical     Christine Navarro is a 33 y.o. . POD #9 s/p , sPEC. On Nifedipine 120 mg XL.    Chief Complaint: Headache    Assessment/Plan    -POD #9 s/p , sPEC with new headache. Will give Tylenol now and Reglan. Draw labs.   -Rash-possible reaction to Nifedipine but she would not be due until tonight and BP is normal now. Patient declines Benadryl.  -Will arrange for pumping.  -Lovenox for DVT prophylaxis,last given yesterday at 1100.    Principal Problem:    Headache in pregnancy, postpartum      Pregnancy Problems (from 23 to present)       Problem Noted Resolved    Headache in pregnancy, postpartum 2024 by Leatha Capone MD No    Priority:  Medium      Hypertension in pregnancy, pre-eclampsia, severe, postpartum condition 2024 by Wale Alonzo MD No    Priority:  Medium      Preeclampsia, severe, third trimester 2024 by Renee Hill MD No    Priority:  Medium      Preeclampsia, third trimester 2024 by Christine Etienne MD No    Priority:  Medium      Gestational hypertension, third trimester 2024 by Leatha Capone MD No    Priority:  Medium      32 weeks gestation of pregnancy 2024 by Leatha Capone MD No    Priority:  Medium      26 weeks gestation of pregnancy 12/15/2023 by Leatha Capone MD No    Priority:  Medium            Subjective   Christine is POD #9 s/p  at [facility] for sPEC. Difficult management of Bps and patient discharged home on Nifedipine 60 XL and Labetalol 800 TID. She stopped the Nifedipine because of a rash on her abdomen and back. She was readmitted 24 for severe range BP at home. She was titrated back on Nifedipine and given hydralzine x1. She was discharged home on Nifedipine 120 mg and she took this last at 2200 last night. She took 3 Tylenol at the same time. At 0100 she started feeling pressure on the left side of her head. She noted that her face was swollen. The H/A is pulsing. No  nausea/vomiting. No visual changes but when she moves her head she feels like she is going to black out. She has been eating and drinking normally. She also reports that the rash on her abdomen is worse. She is breast feeding.      Obstetrical History   OB History    Para Term  AB Living   5 5 4 1 0 5   SAB IAB Ectopic Multiple Live Births   0 0 0 0 5      # Outcome Date GA Lbr Thiago/2nd Weight Sex Delivery Anes PTL Lv   5  24 35w2d  2.76 kg F CS-LTranv Spinal  ALVA   4 Term 20 38w3d  3.402 kg F CS-LTranv Spinal N ALVA      Complications: Gestational hypertension   3 Term 16 38w2d  3.544 kg M Vag-Spont None N ALVA   2 Term 10/22/13 39w0d  3.515 kg F Vag-Spont None N ALVA   1 Term 12 39w0d  3.374 kg M Vag-Spont None N ALVA      Complications: Low-lying placenta, Preeclampsia       Past Medical History  Past Medical History:   Diagnosis Date    Encounter for routine postpartum follow-up 2021    Postpartum exam    Encounter for supervision of normal pregnancy, unspecified, second trimester 2016    Encounter for pregnancy related examination in second trimester    Gestational hypertension     History of pre-eclampsia     Seasonal allergies     Umbilical hernia     Unspecified abdominal hernia without obstruction or gangrene     Hernia    Varicella     Varicose veins during pregnancy         Past Surgical History   Past Surgical History:   Procedure Laterality Date     SECTION, LOW TRANSVERSE         Social History  Social History     Tobacco Use    Smoking status: Never    Smokeless tobacco: Never   Substance Use Topics    Alcohol use: Not Currently     Substance and Sexual Activity   Drug Use Never       Allergies  Patient has no known allergies.     Medications  Medications Prior to Admission   Medication Sig Dispense Refill Last Dose    acetaminophen (Tylenol) 325 mg tablet Take 3 tablets (975 mg) by mouth every 6 hours for 10 days. 120 tablet 0     ibuprofen  "600 mg tablet Take 1 tablet (600 mg) by mouth every 6 hours for 10 days. 40 tablet 0     NIFEdipine ER (Adalat CC) 60 mg 24 hr tablet Take 2 tablets (120 mg) by mouth once daily. Do not crush, chew, or split. 60 tablet      polyethylene glycol (Glycolax, Miralax) 17 gram packet Take 17 g by mouth 2 times a day for 5 days. 10 packet 0     prenatal no115/iron/folic acid (PRENATAL 19 ORAL) Take by mouth.          Objective    Last Vitals  Temp Pulse Resp BP MAP O2 Sat                   Physical Examination  GENERAL: Examination reveals a well developed, well nourished, gravid female in no acute distress. She is alert and cooperative.  LUNGS: clear to auscultation bilaterally  HEART: regular rhythm, little tachycardia.  ABDOMEN: soft, nontender, nondistended, no abnormal masses, no epigastric pain  Dressing clean, dry and intact.  Red papular rash over abdomen, not on back  EXTREMITIES: no redness or tenderness in the calves or thighs, 1+ edema, 1+ DTRs.  PSYCHOLOGICAL: awake and alert; oriented to person, place, and time    Lab Review  Lab Results   Component Value Date    WBC 7.7 02/22/2024    HGB 13.6 02/22/2024    HCT 39.0 02/22/2024     02/22/2024     No results found for: \"GLUCOSE\", \"NA\", \"K\", \"CL\", \"CO2\", \"ANIONGAP\", \"BUN\", \"CREATININE\", \"EGFR\", \"CALCIUM\", \"ALBUMIN\", \"PROT\", \"ALKPHOS\", \"ALT\", \"AST\", \"BILITOT\"    "

## 2024-02-22 NOTE — PROGRESS NOTES
Asked to see pt. Regarding recurrent headache.  Pt. States HA is similar to prior but not as severe.  She denies visual changes. She is concerned about it getting worse when she goes home.  /98,   Pt. Has not slept. Would like to try benadryl and rest to see if it improves prior to going home.  She states this worked for her during her original hospital admission.  A/P:  sPEC s/p RLTCS now PPD9 with readmission for BP control after stopping Nifedipine 2/2 rash, now not felt to be source   -BP improved with Nifedipine   -recurrent mild headache  -trial of benadryl and sleep   -monitor for improvement

## 2024-02-22 NOTE — PROGRESS NOTES
Postpartum Progress Note    Assessment/Plan   Christine Navarro is a 33 y.o.,  who delivered at 35w2d and is now postpartum day 9.  Pt to order food, have coffee to see if that helps her headache.      Subjective:  Pt initially reported her HA was better.   I  left the room, then she reported to nursing it was back.   I talked with the pt about ordering the MRI.  She wants to wait until she eats and has some coffee to see if that improves the headache.    Objective   Allergies:   Patient has no known allergies.    Last Vitals:  Temp Pulse Resp BP MAP Pulse Ox   36.6 °C (97.9 °F)   18           Vitals Min/Max Last 24 Hours:  Temp  Min: 36.3 °C (97.3 °F)  Max: 36.6 °C (97.9 °F)  Pulse  Min: 90  Max: 105  Resp  Min: 18  Max: 18  BP  Min: 104/64  Max: 139/80      Physical Exam:  General: no acute distress; she is awake and alert  Lungs: Normal respiratory effort  Abdomen: soft, non-tender, no distention  Rash on abdomen appears unchanged from my exam yesterday.   Fundus: firm  Extremities: trace edema, no erythema, normal movements  Psychological: appropriate mood and behavior    Lab Data:  Lab Results   Component Value Date    WBC 7.7 2024    HGB 13.6 2024    HCT 39.0 2024     2024     Lab Results   Component Value Date    GLUCOSE 94 2024     (L) 2024    K 4.0 2024    CL 95 (L) 2024    CO2 24 2024    ANIONGAP 17 2024    BUN 15 2024    CREATININE 0.48 (L) 2024    EGFR >90 2024    CALCIUM 9.2 2024    ALBUMIN 4.1 2024    PROT 7.5 2024    ALKPHOS 64 2024    ALT 26 2024    AST 26 2024    BILITOT 0.5 2024        Audra Monsivais MD

## 2024-02-23 PROBLEM — Z04.9 CONDITION NOT FOUND: Status: RESOLVED | Noted: 2023-09-30 | Resolved: 2024-02-23

## 2024-02-23 PROBLEM — K42.9 UMBILICAL HERNIA: Status: RESOLVED | Noted: 2023-09-30 | Resolved: 2024-02-23

## 2024-02-23 PROBLEM — O13.3 GESTATIONAL HYPERTENSION, THIRD TRIMESTER (HHS-HCC): Status: RESOLVED | Noted: 2024-01-25 | Resolved: 2024-02-23

## 2024-02-23 PROBLEM — Z3A.26 26 WEEKS GESTATION OF PREGNANCY (HHS-HCC): Status: RESOLVED | Noted: 2023-12-15 | Resolved: 2024-02-23

## 2024-02-23 PROBLEM — R10.2 FEMALE PELVIC PAIN: Status: RESOLVED | Noted: 2023-09-30 | Resolved: 2024-02-23

## 2024-02-23 PROBLEM — R10.33 UMBILICAL PAIN: Status: RESOLVED | Noted: 2023-09-30 | Resolved: 2024-02-23

## 2024-02-23 PROBLEM — Z3A.32 32 WEEKS GESTATION OF PREGNANCY (HHS-HCC): Status: RESOLVED | Noted: 2024-01-25 | Resolved: 2024-02-23

## 2024-02-23 PROBLEM — O35.9XX0 SUSPECTED FETAL ANOMALY, ANTEPARTUM (HHS-HCC): Status: RESOLVED | Noted: 2023-09-30 | Resolved: 2024-02-23

## 2024-02-23 PROBLEM — O14.93 PREECLAMPSIA, THIRD TRIMESTER (HHS-HCC): Status: RESOLVED | Noted: 2024-02-12 | Resolved: 2024-02-23

## 2024-02-23 PROBLEM — R10.32 ABDOMINAL PAIN, LLQ (LEFT LOWER QUADRANT): Status: RESOLVED | Noted: 2023-09-30 | Resolved: 2024-02-23

## 2024-02-23 PROBLEM — O14.13 PREECLAMPSIA, SEVERE, THIRD TRIMESTER (HHS-HCC): Status: RESOLVED | Noted: 2024-02-12 | Resolved: 2024-02-23

## 2024-02-23 PROCEDURE — 2500000004 HC RX 250 GENERAL PHARMACY W/ HCPCS (ALT 636 FOR OP/ED): Performed by: OBSTETRICS & GYNECOLOGY

## 2024-02-23 PROCEDURE — 2500000001 HC RX 250 WO HCPCS SELF ADMINISTERED DRUGS (ALT 637 FOR MEDICARE OP): Performed by: OBSTETRICS & GYNECOLOGY

## 2024-02-23 PROCEDURE — 2500000002 HC RX 250 W HCPCS SELF ADMINISTERED DRUGS (ALT 637 FOR MEDICARE OP, ALT 636 FOR OP/ED): Performed by: OBSTETRICS & GYNECOLOGY

## 2024-02-23 PROCEDURE — 1120000001 HC OB PRIVATE ROOM DAILY

## 2024-02-23 PROCEDURE — 2500000004 HC RX 250 GENERAL PHARMACY W/ HCPCS (ALT 636 FOR OP/ED)

## 2024-02-23 RX ORDER — LABETALOL 100 MG/1
400 TABLET, FILM COATED ORAL EVERY 8 HOURS
Qty: 90 TABLET | Refills: 1 | Status: SHIPPED | OUTPATIENT
Start: 2024-02-23 | End: 2024-02-26 | Stop reason: HOSPADM

## 2024-02-23 RX ORDER — LABETALOL 100 MG/1
200 TABLET, FILM COATED ORAL ONCE
Status: COMPLETED | OUTPATIENT
Start: 2024-02-23 | End: 2024-02-23

## 2024-02-23 RX ORDER — LABETALOL HYDROCHLORIDE 5 MG/ML
INJECTION, SOLUTION INTRAVENOUS
Status: COMPLETED
Start: 2024-02-23 | End: 2024-02-23

## 2024-02-23 RX ORDER — LABETALOL 100 MG/1
600 TABLET, FILM COATED ORAL EVERY 8 HOURS
Status: DISCONTINUED | OUTPATIENT
Start: 2024-02-24 | End: 2024-02-25

## 2024-02-23 RX ORDER — NIFEDIPINE 60 MG/1
60 TABLET, FILM COATED, EXTENDED RELEASE ORAL 2 TIMES DAILY
Qty: 60 TABLET | Refills: 1 | Status: SHIPPED | OUTPATIENT
Start: 2024-02-23 | End: 2024-03-16 | Stop reason: ALTCHOICE

## 2024-02-23 RX ADMIN — IBUPROFEN 600 MG: 600 TABLET, FILM COATED ORAL at 17:54

## 2024-02-23 RX ADMIN — NIFEDIPINE 60 MG: 30 TABLET, FILM COATED, EXTENDED RELEASE ORAL at 20:01

## 2024-02-23 RX ADMIN — LABETALOL HYDROCHLORIDE 400 MG: 100 TABLET, FILM COATED ORAL at 16:14

## 2024-02-23 RX ADMIN — ACETAMINOPHEN 975 MG: 325 TABLET ORAL at 11:13

## 2024-02-23 RX ADMIN — IBUPROFEN 600 MG: 600 TABLET, FILM COATED ORAL at 11:14

## 2024-02-23 RX ADMIN — IBUPROFEN 600 MG: 600 TABLET, FILM COATED ORAL at 23:58

## 2024-02-23 RX ADMIN — ACETAMINOPHEN 975 MG: 325 TABLET ORAL at 17:54

## 2024-02-23 RX ADMIN — IBUPROFEN 600 MG: 600 TABLET, FILM COATED ORAL at 05:07

## 2024-02-23 RX ADMIN — LABETALOL HYDROCHLORIDE 200 MG: 100 TABLET ORAL at 18:45

## 2024-02-23 RX ADMIN — LABETALOL HYDROCHLORIDE 100 MG: 5 INJECTION, SOLUTION INTRAVENOUS at 16:01

## 2024-02-23 RX ADMIN — ENOXAPARIN SODIUM 40 MG: 40 INJECTION SUBCUTANEOUS at 17:53

## 2024-02-23 RX ADMIN — LABETALOL HYDROCHLORIDE 400 MG: 100 TABLET, FILM COATED ORAL at 08:57

## 2024-02-23 RX ADMIN — ACETAMINOPHEN 975 MG: 325 TABLET ORAL at 23:57

## 2024-02-23 RX ADMIN — NIFEDIPINE 60 MG: 30 TABLET, FILM COATED, EXTENDED RELEASE ORAL at 07:51

## 2024-02-23 RX ADMIN — LABETALOL HYDROCHLORIDE 100 MG: 5 INJECTION INTRAVENOUS at 16:01

## 2024-02-23 RX ADMIN — LABETALOL HYDROCHLORIDE 600 MG: 100 TABLET, FILM COATED ORAL at 23:58

## 2024-02-23 RX ADMIN — ACETAMINOPHEN 975 MG: 325 TABLET ORAL at 05:06

## 2024-02-23 RX ADMIN — LABETALOL HYDROCHLORIDE 400 MG: 100 TABLET, FILM COATED ORAL at 01:00

## 2024-02-23 ASSESSMENT — PAIN DESCRIPTION - LOCATION
LOCATION: HEAD
LOCATION: HEAD

## 2024-02-23 ASSESSMENT — PAIN SCALES - GENERAL
PAINLEVEL_OUTOF10: 5 - MODERATE PAIN
PAINLEVEL_OUTOF10: 7
PAINLEVEL_OUTOF10: 0 - NO PAIN
PAINLEVEL_OUTOF10: 0 - NO PAIN
PAINLEVEL_OUTOF10: 2
PAINLEVEL_OUTOF10: 0 - NO PAIN

## 2024-02-23 ASSESSMENT — PAIN - FUNCTIONAL ASSESSMENT: PAIN_FUNCTIONAL_ASSESSMENT: 0-10

## 2024-02-23 NOTE — LACTATION NOTE
24 1100   Lactation Consultation   Reason for Consult Initial assessment   Consultant Name Marciano Peters   Maternal Information   Has mother  before? Yes   Previous Maternal Breastfeeding Challenges None  (per mother)   Exclusive Pump and Bottle Feed   (pumping and bottle feeding while  from infant)   Maternal Assessment   Nipple Assessment Intact  (per mother)   Alterations in Nipple Condition   (denies pain or skin break down)   Breast Pump   Pump Hospital grade electric pump;Double breast pumping   Frequency   (states she has not been pumping as frequently as recommended, plans to begin pumping more often today, reviewed education, encouraged at least every 3 hours or 8 x in a 24 hour period)   Breast Shield Size and Type 24 mm   Units of Volume Ounces per session  (states she has been filling the 70 ml breast milk storage containers at each pumping session (totalling > 4 oz at each session))   Patient Follow-Up   Inpatient Lactation Follow-up Needed  Yes   Other OB Lactation Documentation    Maternal Risk Factors  delivery;Hypertension   33 year old  patient. Met with patient for routine lactation consult to review goals, address any questions and/or concerns and to offer lactation assistance, support and education as needed and desired. Verbalizes goal of pumping and storing EBM due to separation from infant.  has been taking EBM home and bottle feeding it to infant.  Mother does have questions and concerns about getting infant back to the breast after being given so many bottles. Reviewed education, tips and suggestions.     Pumping handouts and education provided. Patient provided with the opportunity to ask questions. All questions answered. See education flow sheet for detailed list of education topics covered. Encouraged pumping every 2-3 hours or at least 8 x per 24 hours. Reviewed milk storage guidelines, correct flange fit, nipple care and cleaning of pump  parts. Patient denies any further questions or concerns at this time. Offered ongoing lactation assistance, support and education as needed and desired.

## 2024-02-23 NOTE — PROGRESS NOTES
Headache gone  Walked hallways.  Mild range BP's  Agrees to benadryl to help sleep  Likely discharge in am.

## 2024-02-23 NOTE — CARE PLAN
Problem: Hypertensive Disorder of Pregnancy (HDP)  Goal: Minimal s/sx of HDP and BP<160/110  Outcome: Not Progressing   The patient's goals for the shift include No headache    The clinical goals for the shift include Stable blood pressures    Over the shift, the patient did not make progress toward the following goals: Stable Blood Pressures. Barriers to progression include Severe Range Blood Pressures and needing med adjustments. Recommendations to address these barriers include following the new medication regimen and keeping the patient calm and informed.    Problem: Hypertensive Disorder of Pregnancy (HDP)  Goal: Minimal s/sx of HDP and BP<160/110  Outcome: Not Progressing

## 2024-02-23 NOTE — SIGNIFICANT EVENT
Received call from RN that patient had called her to room and complained of mild headache, facial swelling, and tingling in her hands. /91. BP then cycled for an hour with all normal range BP in the 130's/70-80's. CTPR to review BP monitoring. She states she took a nap during monitoring and is feeling much better. Her headache and paresthesias have resolved and she does not feel like her face is swollen.  Will continue meds at current dose and continue to monitor. Patient to take a shower and take a walk around unit with plan for BP check afterwards.   Viridiana Morriosn, DO

## 2024-02-23 NOTE — PROGRESS NOTES
Postpartum Progress Note    Assessment/Plan   Christine Navarro is a 33 y.o., , who delivered at 35w2d gestation and is now postpartum day 10.    Patient doing well overnight  Continue routine care  Tylenol/Ibuprofen PRN pain management  BP fluctuant mild range throughout the day yesterday, normal range this morning  Patient is asymptomatic with benign exam  Continue Labetalol 400 mg TID and Nifedipine XR 60 mg BID. Patient is tolerating well  Will monitor today and if continue normal to low mild range and patient continues to be asymptomatic will consider discharge this afternoon      Principal Problem:    Headache in pregnancy, postpartum    Pregnancy Problems (from 23 to present)       Problem Noted Resolved    Headache in pregnancy, postpartum 2024 by Leatha Capone MD No    Priority:  Medium      Hypertension in pregnancy, pre-eclampsia, severe, postpartum condition 2024 by Wale Alonzo MD No    Priority:  Medium      Preeclampsia, severe, third trimester 2024 by Renee Hill MD No    Priority:  Medium      Preeclampsia, third trimester 2024 by Christine Etienne MD No    Priority:  Medium      Gestational hypertension, third trimester 2024 by Leatha Capone MD No    Priority:  Medium      32 weeks gestation of pregnancy 2024 by Leatha Capone MD No    Priority:  Medium      26 weeks gestation of pregnancy 12/15/2023 by Leatha Capone MD No    Priority:  Medium            Hospital course: Gestational hypertension managed postpartum on two agents. Severe headache that has resolved since admission.       Subjective   Patient doing well this morning without acute event overnight. Her postoperative pain continues to improve and she is meeting all other postpartum milestones appropriate for 10 days postop. Her BP is normal range this morning. She states she is feeling significantly better and tolerating the split dose Nifedipine better. She denies HA, change in vision, chest  pain, shortness of breath.     Objective   Allergies:   Patient has no known allergies.         Last Vitals:  Temp Pulse Resp BP MAP Pulse Ox   36.5 °C (97.7 °F) 77 16 125/83   98 %     Vitals Min/Max Last 24 Hours:  Temp  Min: 36.5 °C (97.7 °F)  Max: 37.2 °C (99 °F)  Pulse  Min: 71  Max: 100  Resp  Min: 16  Max: 18  BP  Min: 123/85  Max: 174/101    Intake/Output:   No intake or output data in the 24 hours ending 02/23/24 0851    Physical Exam:  General: Examination reveals a well developed, well nourished, female, in no acute distress. She is alert and cooperative.  Lungs: clear to auscultation bilaterally.  Cardiac: regular rate and rhythm, S1, S2 normal, no murmur, click, rub or gallop.  Abdomen: soft, NT, ND, no tenderness to palpation RUQ.  Fundus: firm, below umbilicus, and nontender.  Neurological: DTRs normal and symmetrical, no myoclonus.  Psychological: awake and alert; oriented to person, place, and time.    Lab Data:  Lab Results   Component Value Date    WBC 7.7 02/22/2024    HGB 13.6 02/22/2024    HCT 39.0 02/22/2024     02/22/2024     Lab Results   Component Value Date    GLUCOSE 94 02/22/2024     (L) 02/22/2024    K 4.0 02/22/2024    CL 95 (L) 02/22/2024    CO2 24 02/22/2024    ANIONGAP 17 02/22/2024    BUN 15 02/22/2024    CREATININE 0.48 (L) 02/22/2024    EGFR >90 02/22/2024    CALCIUM 9.2 02/22/2024    ALBUMIN 4.1 02/22/2024    PROT 7.5 02/22/2024    ALKPHOS 64 02/22/2024    ALT 26 02/22/2024    AST 26 02/22/2024    BILITOT 0.5 02/22/2024   Viridiana Morrison, DO

## 2024-02-23 NOTE — PROGRESS NOTES
Reviewed previous events with Dr. Manuel.  Pt seen and evaluated.  BP's in last hour - 1750h 138/85, 1810h 141/85, 1830h 156/92, 1850h 137/89.  Pt still has not slept. Took a short nap today. Has been up and out of bed only to go to the bathroom.     Pt received IV labetalol for severe range BP and then was given oral dose labetalol for plan of tid dosing.   Plan to continue pt on Nifedipine ER 120mg but will divide into 12 hour dosing. This was the regimen [place] was [place] [facility] Will give nifedipine ER 60 mg now.     Plan to encourage pt to take sleep aid tonight.     Audra Monsivais MD

## 2024-02-23 NOTE — SIGNIFICANT EVENT
"Pt called RN to bedside with c/o numbness to hands and arms and return of headache and feeling \"off\" again. VSS. Dr. Babcock notified. Will cycle blood pressures and update Dr. Babcock.   "

## 2024-02-23 NOTE — SIGNIFICANT EVENT
"Dr. Babcock notified of severely elevated blood pressure. Orders for serial blood pressures to be taken. Patient c/o heaviness in the eyes, feeling flushed and just overall \"not well\" per patient.   "

## 2024-02-23 NOTE — CARE PLAN
Goals-No severes BP and No headache    Pt had a good night last night. No headache this whole shift. Took a walk around unit before bed. Slept good throughout the night and feels more rested. Did have one high mild around 0100 but morning BP was very good. No severes this shift. Hopeful to go home today.      Problem: Hypertensive Disorder of Pregnancy (HDP)  Goal: Minimal s/sx of HDP and BP<160/110  Outcome: Progressing     Problem: Pain - Adult  Goal: Verbalizes/displays adequate comfort level or baseline comfort level  Outcome: Progressing

## 2024-02-23 NOTE — SIGNIFICANT EVENT
Dr. Babcock notified of severely elevated BP. Orders for Hypertension protocol taken. 20mg IVP labetalol to be given.

## 2024-02-23 NOTE — LACTATION NOTE
Lactation Consultant Note  Lactation Consultation  Reason for Consult: Follow-up assessment  Consultant Name: JEANE Mcnamara    Maternal Information  Has mother  before?: Yes  How long did the mother previously breastfeed?: 16 months  Exclusive Pump and Bottle Feed:  (mother is pumping and bottle feeding during hospital admission)    Feeding Assessment  Feeding Method:  (Reviewed paced bottle)    Breast Pump  Pump: Hospital grade electric pump  Frequency: 8-10 times per day  Duration: 15-20 minutes per session  Breast Shield Size and Type: 24 mm  Volume of Milk Production: 1  Units of Volume: Ounces per session    Patient Follow-up  Inpatient Lactation Follow-up Needed :  (as needed)  Outpatient Lactation Follow-up: Recommended (if struggles to get infant back to the breast)    Other OB Lactation Documentation  Maternal Risk Factors: Age over 30, primiparity,  delivery, Hypertension    Recommendations/Summary  33 year old pp readmit for elevated blood pressures. Mother has been pumping every two to three hours today. She verbalizes no concerns with using the pump. Mother reports having more output in the morning and verbalizes concerns of it being lower at night. Reviewed levels of prolactin and that this is normal. Mother is exclusively pumping while in the hospital but would like to get infant to the breast when she is discharged. Encouraged mother to talk to caregivers of the  to make sure they are paced bottle feeding so infant has an easier time going back to the breast. Educated mother on outpatient lactation if she is struggling with breastfeeding after discharge. Mother reports understanding and denies any further questions or concerns. Offered ongoing support as needed or desired.

## 2024-02-24 PROCEDURE — 2500000001 HC RX 250 WO HCPCS SELF ADMINISTERED DRUGS (ALT 637 FOR MEDICARE OP): Performed by: OBSTETRICS & GYNECOLOGY

## 2024-02-24 PROCEDURE — 2500000004 HC RX 250 GENERAL PHARMACY W/ HCPCS (ALT 636 FOR OP/ED): Performed by: OBSTETRICS & GYNECOLOGY

## 2024-02-24 PROCEDURE — 1120000001 HC OB PRIVATE ROOM DAILY

## 2024-02-24 PROCEDURE — 99238 HOSP IP/OBS DSCHRG MGMT 30/<: CPT | Performed by: OBSTETRICS & GYNECOLOGY

## 2024-02-24 PROCEDURE — 2500000002 HC RX 250 W HCPCS SELF ADMINISTERED DRUGS (ALT 637 FOR MEDICARE OP, ALT 636 FOR OP/ED): Performed by: OBSTETRICS & GYNECOLOGY

## 2024-02-24 RX ADMIN — LABETALOL HYDROCHLORIDE 600 MG: 100 TABLET, FILM COATED ORAL at 08:09

## 2024-02-24 RX ADMIN — LABETALOL HYDROCHLORIDE 600 MG: 100 TABLET, FILM COATED ORAL at 15:11

## 2024-02-24 RX ADMIN — NIFEDIPINE 60 MG: 30 TABLET, FILM COATED, EXTENDED RELEASE ORAL at 08:07

## 2024-02-24 RX ADMIN — ACETAMINOPHEN 650 MG: 325 TABLET ORAL at 15:14

## 2024-02-24 RX ADMIN — ENOXAPARIN SODIUM 40 MG: 40 INJECTION SUBCUTANEOUS at 18:38

## 2024-02-24 RX ADMIN — IBUPROFEN 600 MG: 600 TABLET, FILM COATED ORAL at 06:02

## 2024-02-24 RX ADMIN — NIFEDIPINE 60 MG: 30 TABLET, FILM COATED, EXTENDED RELEASE ORAL at 20:22

## 2024-02-24 RX ADMIN — ACETAMINOPHEN 975 MG: 325 TABLET ORAL at 06:02

## 2024-02-24 ASSESSMENT — PAIN DESCRIPTION - LOCATION
LOCATION: HEAD
LOCATION: HEAD

## 2024-02-24 ASSESSMENT — PAIN SCALES - GENERAL
PAINLEVEL_OUTOF10: 1
PAINLEVEL_OUTOF10: 0 - NO PAIN
PAINLEVEL_OUTOF10: 0 - NO PAIN
PAINLEVEL_OUTOF10: 4
PAINLEVEL_OUTOF10: 0 - NO PAIN
PAINLEVEL_OUTOF10: 1

## 2024-02-24 NOTE — PROGRESS NOTES
Postpartum Progress Note    Assessment/Plan   Christine Navarro is a 33 y.o., , who delivered at 35w2d gestation and is now postpartum day 11.    Patient is doing well with improved BP overnight  She is asymptomatic this morning  Continue Labetalol 600 mg TID, Nifedipine XR 60 mg BID  Ibuprofen/Tylenol for post op pain  Regular diet  Encouraged ambulation  SCD/Lovenox for DVT prophylaxis  Will monitor for BP spikes and symptoms this morning. Anticipate discharge home with precautions and short interval follow up this afternoon.     Principal Problem:    Headache in pregnancy, postpartum  Active Problems:    Rash/skin eruption    Hypertension in pregnancy, pre-eclampsia, severe, postpartum condition    Pregnancy Problems (from 23 to present)       Problem Noted Resolved    Headache in pregnancy, postpartum 2024 by Leatha Capone MD No    Priority:  High      Overview Signed 2024  2:45 PM by Viridiana Morrison DO     Resolved during admission for med titration.   Precautions given on discharge         Hypertension in pregnancy, pre-eclampsia, severe, postpartum condition 2024 by Wale Alonzo MD No    Priority:  Medium      Overview Signed 2024  2:45 PM by Viridiana Morrison DO     Patient restarted on Nifedipine and titrated to 120 mg daily in split doses  Labetalol 400 mg TID  BP normal to mild range during admission. Continue current dosing until 6 weeks PP then reassess.         Preeclampsia, severe, third trimester 2024 by Renee Hill MD 2024 by Viridiana Morrison DO    Priority:  Medium      Preeclampsia, third trimester 2024 by Christine Etienne MD 2024 by Viridiana Morrison DO    Priority:  Medium      Gestational hypertension, third trimester 2024 by Leatha Capone MD 2024 by Viridiana Morrison DO    Priority:  Medium      32 weeks gestation of pregnancy 2024 by Leatha Capone MD 2024 by Viridiana Morrison DO     Priority:  Medium      26 weeks gestation of pregnancy 12/15/2023 by Leatha Capone MD 2/23/2024 by Viridiana Morrison DO    Priority:  Medium            Hospital course: gestational hypertension  Admitted for severe range BP and severe HA. Symptoms resolved and BP improved after titration of antihypertensive medication.       Subjective   Patient doing well this morning without acute event overnight. BP spikes to the severe range yesterday afternoon, with improvement following titration of Labetalol to 600 mg. Patient is feeling good this morning and denies HA, change in vision, CP/SOB, abdominal pain. She is continuing to meet all other postoperative milestones.   \  Objective   Allergies:   Patient has no known allergies.         Last Vitals:  Temp Pulse Resp BP MAP Pulse Ox   36.7 °C (98.1 °F) 85 18 (!) 141/88   98 %     Vitals Min/Max Last 24 Hours:  Temp  Min: 36.4 °C (97.5 °F)  Max: 36.8 °C (98.2 °F)  Pulse  Min: 71  Max: 94  Resp  Min: 16  Max: 20  BP  Min: 113/77  Max: 173/105    Intake/Output:   No intake or output data in the 24 hours ending 02/24/24 0814    Physical Exam:  General: Examination reveals a well developed, well nourished, female, in no acute distress. She is alert and cooperative.  Lungs: clear to auscultation bilaterally.  Cardiac: regular rate and rhythm, S1, S2 normal, no murmur, click, rub or gallop.  Fundus: firm, below umbilicus, and nontender.  Extremities: no redness or tenderness in the calves or thighs, no edema.  Psychological: awake and alert; oriented to person, place, and time.    Lab Data:  Lab Results   Component Value Date    WBC 7.7 02/22/2024    HGB 13.6 02/22/2024    HCT 39.0 02/22/2024     02/22/2024     Lab Results   Component Value Date    GLUCOSE 94 02/22/2024     (L) 02/22/2024    K 4.0 02/22/2024    CL 95 (L) 02/22/2024    CO2 24 02/22/2024    ANIONGAP 17 02/22/2024    BUN 15 02/22/2024    CREATININE 0.48 (L) 02/22/2024    EGFR >90 02/22/2024     CALCIUM 9.2 02/22/2024    ALBUMIN 4.1 02/22/2024    PROT 7.5 02/22/2024    ALKPHOS 64 02/22/2024    ALT 26 02/22/2024    AST 26 02/22/2024    BILITOT 0.5 02/22/2024   Viridiana Morrison, DO

## 2024-02-24 NOTE — CARE PLAN
Problem: Hypertensive Disorder of Pregnancy (HDP)  Goal: Minimal s/sx of HDP and BP<160/110  Outcome: Progressing     Problem: Pain - Adult  Goal: Verbalizes/displays adequate comfort level or baseline comfort level  Outcome: Progressing

## 2024-02-24 NOTE — DISCHARGE SUMMARY
Discharge Summary    Admission Date: 2024  Discharge Date: 24    Discharge Diagnosis  HTN and headache, postpartum    Hospital Course  Delivery Date: 2024  4:56 AM   Delivery type: , Low Transverse    GA at delivery: 35w2d  Outcome: Living   Anesthesia during delivery: Spinal   Intrapartum complications: None   Feeding method: Breastfeeding Status: Yes     Patient readmitted for severe range BP and headache. Her antihypertensive medication was titrated with resolution of symptoms and improvement in BP. Labile BP's during admission. After consult with M Dr. Downing, 3rd BP med added, hydrochlorothiazide 12.5 mg. In addition to hydrochlorothiazide, Discharged home on Nifedipine ER 60 mg po bid, labetalol 800 mg po tid.     Procedures: none  Contraception at discharge: will be discussed at postpartum visit      Pertinent Physical Exam At Time of Discharge    General: Examination reveals a well developed, well nourished, female, in no acute distress. She is alert and cooperative.  Lungs: clear to auscultation bilaterally.  Cardiac: regular rate and rhythm, S1, S2 normal, no murmur, click, rub or gallop.  Fundus: firm, below umbilicus, and nontender.  Extremities: no redness or tenderness in the calves or thighs, no edema.  Psychological: awake and alert; oriented to person, place, and time.    Discharge Meds     Your medication list        START taking these medications        Instructions Last Dose Given Next Dose Due   labetalol 100 mg tablet  Commonly known as: Normodyne      Take 4 tablets (400 mg) by mouth every 8 hours.              CHANGE how you take these medications        Instructions Last Dose Given Next Dose Due   NIFEdipine ER 60 mg 24 hr tablet  Commonly known as: Adalat CC  What changed:   how much to take  when to take this      Take 1 tablet (60 mg) by mouth 2 times a day. Do not crush, chew, or split.              CONTINUE taking these medications        Instructions Last  Dose Given Next Dose Due   acetaminophen 325 mg tablet  Commonly known as: Tylenol      Take 3 tablets (975 mg) by mouth every 6 hours for 10 days.       ibuprofen 600 mg tablet      Take 1 tablet (600 mg) by mouth every 6 hours for 10 days.       polyethylene glycol 17 gram packet  Commonly known as: Glycolax, Miralax      Take 17 g by mouth 2 times a day for 5 days.       PRENATAL 19 ORAL                     Where to Get Your Medications        These medications were sent to [facility]/pharmacy #[id number] - [place], [place] - [address] AT [facility] [address] [facility] [address]  [address], [place] [facility] [address]      Phone: [phone]   labetalol 100 mg tablet  NIFEdipine ER 60 mg 24 hr tablet          Complications Requiring Follow-Up  Preeclampsia/BP control    Test Results Pending At Discharge  Pending Labs       No current pending labs.            Outpatient Follow-Up  RTO Friday for BP check/admission follow up    I spent 30 minutes in the professional and overall care of this patient.      Audra Monsivais MD

## 2024-02-24 NOTE — LACTATION NOTE
"Lactation Consultant Note  Lactation Consultation  Reason for Consult: Follow-up assessment  Consultant Name: JEANE Bowman    Maternal Information  Has mother  before?: Yes    Maternal Assessment       Infant Assessment       Feeding Assessment       LATCH TOOL       Breast Pump  Breast Shield Size and Type: 24 mm  Units of Volume: Ounces per session    Other OB Lactation Tools       Patient Follow-up  Inpatient Lactation Follow-up Needed : No  Outpatient Lactation Follow-up: Recommended (If mother struggles to get infant back to breast)  Lactation Professional - OK to Discharge: Yes    Other OB Lactation Documentation  Maternal Risk Factors: Hypertension    Recommendations/Summary  Patient is post partum readmit for blood pressure issues. Mother to be discharged today. LC in room for consult. Mother states that pumping is going well and reports pumping every 2-3 hours. Mother denies any issues and states she has no questions regarding breastfeeding/ pumping/ discharge.  reviewed nifedipine and labetalol medication info with both parents. Parents given a copy of each medication information sheet from \"[employer]\". Parents grateful for information. Parents to read and make an informed decision based off material.     Offered ongoing assistance with breastfeeding. No further questions or concerns at this time.  "

## 2024-02-25 PROCEDURE — 2500000001 HC RX 250 WO HCPCS SELF ADMINISTERED DRUGS (ALT 637 FOR MEDICARE OP): Performed by: OBSTETRICS & GYNECOLOGY

## 2024-02-25 PROCEDURE — 2500000002 HC RX 250 W HCPCS SELF ADMINISTERED DRUGS (ALT 637 FOR MEDICARE OP, ALT 636 FOR OP/ED): Performed by: OBSTETRICS & GYNECOLOGY

## 2024-02-25 PROCEDURE — 2500000004 HC RX 250 GENERAL PHARMACY W/ HCPCS (ALT 636 FOR OP/ED): Performed by: OBSTETRICS & GYNECOLOGY

## 2024-02-25 PROCEDURE — 1120000001 HC OB PRIVATE ROOM DAILY

## 2024-02-25 RX ORDER — LABETALOL 100 MG/1
200 TABLET, FILM COATED ORAL ONCE
Status: COMPLETED | OUTPATIENT
Start: 2024-02-25 | End: 2024-02-25

## 2024-02-25 RX ORDER — HYDROCHLOROTHIAZIDE 12.5 MG/1
12.5 TABLET ORAL DAILY
Status: DISCONTINUED | OUTPATIENT
Start: 2024-02-25 | End: 2024-02-26 | Stop reason: HOSPADM

## 2024-02-25 RX ORDER — LABETALOL 100 MG/1
800 TABLET, FILM COATED ORAL EVERY 8 HOURS
Status: DISCONTINUED | OUTPATIENT
Start: 2024-02-26 | End: 2024-02-26 | Stop reason: HOSPADM

## 2024-02-25 RX ADMIN — LABETALOL HYDROCHLORIDE 200 MG: 100 TABLET, FILM COATED ORAL at 14:23

## 2024-02-25 RX ADMIN — ACETAMINOPHEN 975 MG: 325 TABLET ORAL at 13:10

## 2024-02-25 RX ADMIN — IBUPROFEN 600 MG: 600 TABLET, FILM COATED ORAL at 15:29

## 2024-02-25 RX ADMIN — NIFEDIPINE 60 MG: 30 TABLET, FILM COATED, EXTENDED RELEASE ORAL at 07:36

## 2024-02-25 RX ADMIN — POLYETHYLENE GLYCOL 3350 17 G: 17 POWDER, FOR SOLUTION ORAL at 22:51

## 2024-02-25 RX ADMIN — ACETAMINOPHEN 650 MG: 325 TABLET ORAL at 07:39

## 2024-02-25 RX ADMIN — LABETALOL HYDROCHLORIDE 200 MG: 100 TABLET, FILM COATED ORAL at 21:18

## 2024-02-25 RX ADMIN — HYDROCHLOROTHIAZIDE 12.5 MG: 12.5 TABLET ORAL at 21:24

## 2024-02-25 RX ADMIN — ENOXAPARIN SODIUM 40 MG: 40 INJECTION SUBCUTANEOUS at 16:39

## 2024-02-25 RX ADMIN — ACETAMINOPHEN 975 MG: 325 TABLET ORAL at 22:51

## 2024-02-25 RX ADMIN — LABETALOL HYDROCHLORIDE 600 MG: 100 TABLET, FILM COATED ORAL at 00:07

## 2024-02-25 RX ADMIN — LABETALOL HYDROCHLORIDE 600 MG: 100 TABLET, FILM COATED ORAL at 12:49

## 2024-02-25 RX ADMIN — NIFEDIPINE 60 MG: 30 TABLET, FILM COATED, EXTENDED RELEASE ORAL at 19:34

## 2024-02-25 RX ADMIN — LABETALOL HYDROCHLORIDE 600 MG: 100 TABLET, FILM COATED ORAL at 07:35

## 2024-02-25 RX ADMIN — ACETAMINOPHEN 650 MG: 325 TABLET ORAL at 00:11

## 2024-02-25 RX ADMIN — LABETALOL HYDROCHLORIDE 600 MG: 100 TABLET, FILM COATED ORAL at 19:33

## 2024-02-25 ASSESSMENT — PAIN SCALES - GENERAL
PAINLEVEL_OUTOF10: 0 - NO PAIN
PAINLEVEL_OUTOF10: 5 - MODERATE PAIN
PAINLEVEL_OUTOF10: 9
PAINLEVEL_OUTOF10: 5 - MODERATE PAIN
PAINLEVEL_OUTOF10: 2
PAINLEVEL_OUTOF10: 6

## 2024-02-25 ASSESSMENT — PAIN DESCRIPTION - DESCRIPTORS
DESCRIPTORS: CRAMPING
DESCRIPTORS: CRAMPING
DESCRIPTORS: THROBBING

## 2024-02-25 ASSESSMENT — PAIN DESCRIPTION - LOCATION
LOCATION: ABDOMEN
LOCATION: HEAD
LOCATION: ABDOMEN
LOCATION: HEAD

## 2024-02-25 NOTE — CARE PLAN
The patient's goals for the shift include BP control    The clinical goals for the shift include BP's mild range or below through shift    Over the shift, the patient did make progress toward the following goals. BP's below mild range through shift. Pt states pain well controlled with one dose PRN Tylenol during shift.

## 2024-02-25 NOTE — SIGNIFICANT EVENT
/104 prior to receiving Labetalol. Patient states she feels throbbing in her ears. Repeat /90 after labetalol. Will notify Dr. Babcock

## 2024-02-25 NOTE — LACTATION NOTE
Lactation Consultant Note  Lactation Consultation  Reason for Consult: Follow-up assessment  Consultant Name: JEANE Mcnamara    Maternal Information  Has mother  before?: Yes  How long did the mother previously breastfeed?: 16 months    Breast Pump  Pump: Hospital grade electric pump  Frequency: 8-10 times per day  Breast Shield Size and Type: 24 mm  Units of Volume: Ounces per session    Patient Follow-up  Inpatient Lactation Follow-up Needed : No  Outpatient Lactation Follow-up: Recommended (if struggling to get infant back to the breast)    Other OB Lactation Documentation  Maternal Risk Factors: Hypertension, Age over 30, primiparity    Recommendations/Summary  33 year old  postpartum readmit for blood pressure issues. Mother has been pumping every 2-3 hours while baby is not with her. Mother reports that infant was at the bedside yesterday for a few hours and she nursed well with no issues. Reminded mother of outpatient lactation if she is struggling with latching infant. Mother denies any concerns or issues at this time and is hoping to be discharged today. Offered ongoing support and education as needed.

## 2024-02-25 NOTE — PROGRESS NOTES
Postpartum Progress Note    Assessment/Plan   Christine Navarro is a 33 y.o., , who delivered at 35w2d gestation and is now postpartum day 12.    Patient is doing well  Single severe range BP yesterday, improvement with moving afternoon Labetalol dosing up. Will give afternoon Labetalol dose at 13:00 today and monitor  Patient asymptomatic  Regular diet  Encouraged ambulation  SCD/Lovenox for DVT prophylaxis    Principal Problem:    Headache in pregnancy, postpartum  Active Problems:    Rash/skin eruption    Hypertension in pregnancy, pre-eclampsia, severe, postpartum condition    Pregnancy Problems (from 23 to present)       Problem Noted Resolved    Headache in pregnancy, postpartum 2024 by Leatha Capone MD No    Priority:  High      Overview Signed 2024  2:45 PM by Viridiana Morrison DO     Resolved during admission for med titration.   Precautions given on discharge         Hypertension in pregnancy, pre-eclampsia, severe, postpartum condition 2024 by Wale Alonzo MD No    Priority:  Medium      Overview Signed 2024  2:45 PM by Viridiana Morrison DO     Patient restarted on Nifedipine and titrated to 120 mg daily in split doses  Labetalol 400 mg TID  BP normal to mild range during admission. Continue current dosing until 6 weeks PP then reassess.         Preeclampsia, severe, third trimester 2024 by Renee Hill MD 2024 by Viridiana Morrison DO    Priority:  Medium      Preeclampsia, third trimester 2024 by Christine Etienne MD 2024 by Viridiana Morrison DO    Priority:  Medium      Gestational hypertension, third trimester 2024 by Leatha Capone MD 2024 by Viridiana Morrison DO    Priority:  Medium      32 weeks gestation of pregnancy 2024 by Leatha Capone MD 2024 by Viridiana Morrison DO    Priority:  Medium      26 weeks gestation of pregnancy 12/15/2023 by Leatha Capone MD 2024 by Viridiana Morrison  DO    Priority:  Medium            Hospital course: no complications       Subjective   Patient is doing well without acute event overnight. Her BP has been normal to mild range in the last 12 hours and she denies HA, change in vision, CP/SOB.     Objective   Allergies:   Patient has no known allergies.         Last Vitals:  Temp Pulse Resp BP MAP Pulse Ox   36.8 °C (98.2 °F) 80 16 (!) 159/94   98 %     Vitals Min/Max Last 24 Hours:  Temp  Min: 36.5 °C (97.7 °F)  Max: 36.8 °C (98.2 °F)  Pulse  Min: 77  Max: 87  Resp  Min: 16  Max: 18  BP  Min: 114/75  Max: 163/97    Intake/Output:   No intake or output data in the 24 hours ending 02/25/24 0918    Physical Exam:  General: Examination reveals a well developed, well nourished, female, in no acute distress. She is alert and cooperative.  Lungs: appropriate effort.  Fundus: firm, below umbilicus, and nontender.  Extremities: no redness or tenderness in the calves or thighs, no edema.  Psychological: awake and alert; oriented to person, place, and time.    Lab Data:      Viridiana Morrison DO

## 2024-02-25 NOTE — NURSING NOTE
/107 at 1835. Patient stated that the cuff kept pumping up. Cuff repositioned and repeat was 152/94. Dr. Babcock notified, and responded. Plan is for internal medicine consult. BP cycling q 15 min

## 2024-02-26 ENCOUNTER — TELEPHONE (OUTPATIENT)
Dept: OBSTETRICS AND GYNECOLOGY | Facility: CLINIC | Age: 34
End: 2024-02-26
Payer: COMMERCIAL

## 2024-02-26 VITALS
HEIGHT: 64 IN | WEIGHT: 210.32 LBS | SYSTOLIC BLOOD PRESSURE: 159 MMHG | OXYGEN SATURATION: 98 % | BODY MASS INDEX: 35.91 KG/M2 | HEART RATE: 80 BPM | DIASTOLIC BLOOD PRESSURE: 94 MMHG | RESPIRATION RATE: 16 BRPM | TEMPERATURE: 98.5 F

## 2024-02-26 VITALS
SYSTOLIC BLOOD PRESSURE: 129 MMHG | HEART RATE: 85 BPM | DIASTOLIC BLOOD PRESSURE: 81 MMHG | TEMPERATURE: 97.9 F | OXYGEN SATURATION: 98 % | RESPIRATION RATE: 17 BRPM

## 2024-02-26 PROCEDURE — 2500000001 HC RX 250 WO HCPCS SELF ADMINISTERED DRUGS (ALT 637 FOR MEDICARE OP): Performed by: OBSTETRICS & GYNECOLOGY

## 2024-02-26 PROCEDURE — 2500000002 HC RX 250 W HCPCS SELF ADMINISTERED DRUGS (ALT 637 FOR MEDICARE OP, ALT 636 FOR OP/ED): Performed by: OBSTETRICS & GYNECOLOGY

## 2024-02-26 PROCEDURE — 2500000004 HC RX 250 GENERAL PHARMACY W/ HCPCS (ALT 636 FOR OP/ED): Performed by: OBSTETRICS & GYNECOLOGY

## 2024-02-26 RX ORDER — LABETALOL 100 MG/1
TABLET, FILM COATED ORAL
Qty: 360 TABLET | Refills: 0 | Status: SHIPPED | OUTPATIENT
Start: 2024-02-26 | End: 2024-03-16 | Stop reason: ALTCHOICE

## 2024-02-26 RX ORDER — HYDROCHLOROTHIAZIDE 12.5 MG/1
12.5 CAPSULE ORAL DAILY
Qty: 30 CAPSULE | Refills: 0 | Status: SHIPPED | OUTPATIENT
Start: 2024-02-27 | End: 2024-03-16 | Stop reason: ALTCHOICE

## 2024-02-26 RX ADMIN — ACETAMINOPHEN 975 MG: 325 TABLET ORAL at 09:04

## 2024-02-26 RX ADMIN — ENOXAPARIN SODIUM 40 MG: 40 INJECTION SUBCUTANEOUS at 10:46

## 2024-02-26 RX ADMIN — HYDROCHLOROTHIAZIDE 12.5 MG: 12.5 TABLET ORAL at 08:10

## 2024-02-26 RX ADMIN — NIFEDIPINE 60 MG: 30 TABLET, FILM COATED, EXTENDED RELEASE ORAL at 08:11

## 2024-02-26 RX ADMIN — LABETALOL HYDROCHLORIDE 800 MG: 100 TABLET, FILM COATED ORAL at 05:35

## 2024-02-26 ASSESSMENT — PAIN SCALES - GENERAL
PAINLEVEL_OUTOF10: 5 - MODERATE PAIN
PAINLEVEL_OUTOF10: 0 - NO PAIN
PAINLEVEL_OUTOF10: 0 - NO PAIN

## 2024-02-26 ASSESSMENT — PAIN DESCRIPTION - DESCRIPTORS: DESCRIPTORS: CRAMPING

## 2024-02-26 NOTE — LACTATION NOTE
Lactation Consultant Note    Recommendations/Summary  34 y/o  post partum readmit for elevated blood pressures. Per bedside RN, mother is about to be discharged and has been pumping independently since readmission and has not requested LC assistance at this time.

## 2024-02-26 NOTE — SIGNIFICANT EVENT
Spoke with RN several times this afternoon. After titrating labetalol to 800 mg TID in addition to Nifedipine XR 60 mg BID, the patient continues to have labile BP. She has had several severe range BP throughout the afternoon with mild BP on 15 minute repeat. Labetalol dosing time has been moved up to account for anticipated BP spikes and, although the patient responds well to Labetalol once dosed, she is still having BP spikes before doses are given. Patient additionally reports HA/head pressure around the time of each spike, with resolution of symptoms following dosing/improvement of BP. Patient is now 10 days postpartum and received Mag during admission for delivery, she is not currently a candidate for Mag. Call placed through transfer center for MFM consult. Spoke with Dr. Downing regarding case. Plan to add hydrochlorothiazide 12.5 mg. Order placed in EMR and RN updated on plan.   Viridiana Morrison, DO

## 2024-02-26 NOTE — PROGRESS NOTES
Postpartum Progress Note    Assessment/Plan   Christine Navarro is a 33 y.o.,  who delivered at 35w2d, readmit for BP management,  and is now postpartum day 13.  Discharge home.  BP med regimen on discharge  Nifedipine ER 60 mg po bid  Labetalol 800 mg po tid  Hydrochlorothiazide 12.5 mg every pm  Plan for home BP checks twice daily.   Followup in office Friday.        Principal Problem:    Headache in pregnancy, postpartum  Active Problems:    Rash/skin eruption    Hypertension in pregnancy, pre-eclampsia, severe, postpartum condition        Subjective   Pt reports feeling very well this am. Slept well.   Notes that she is urinating a lot, swelling is less after hydrochlorothiazide last pm.   BP this am 129/81. Pulse 81.        Objective   Allergies:   Patient has no known allergies.    Last Vitals:  Temp Pulse Resp BP MAP Pulse Ox   36.9 °C (98.5 °F) 80 16  (Dr. Babcock notified of patients severe BP followed by mild BP. orders to continue 15 minutes vitals for one hour. if not severe re check per protocol.)   98 %     Vitals Min/Max Last 24 Hours:  Temp  Min: 36.7 °C (98.1 °F)  Max: 36.9 °C (98.5 °F)  Pulse  Min: 67  Max: 99  Resp  Min: 16  Max: 16  BP  Min: 105/58  Max: 171/104      Physical Exam:  General: no acute distress; she is awake and alert  Chest CTA  CV RRR  Abdomen: soft, non-tender, no distention  Fundus: firm  Extremities: no edema, no erythema, normal movements  Psychological: appropriate mood and behavior       Audra Monsivais MD

## 2024-02-26 NOTE — NURSING NOTE
"Dr. Babcock notified of most recent BP, cycling q 15 min. Last /104. Patient c/o \"dizziness and head pressure.\" Report given to DORIAN Unger RN.   "

## 2024-02-29 ENCOUNTER — POSTPARTUM VISIT (OUTPATIENT)
Dept: OBSTETRICS AND GYNECOLOGY | Facility: CLINIC | Age: 34
End: 2024-02-29
Payer: COMMERCIAL

## 2024-02-29 VITALS — SYSTOLIC BLOOD PRESSURE: 146 MMHG | WEIGHT: 203 LBS | DIASTOLIC BLOOD PRESSURE: 90 MMHG | BODY MASS INDEX: 34.83 KG/M2

## 2024-02-29 PROCEDURE — 0503F POSTPARTUM CARE VISIT: CPT | Performed by: OBSTETRICS & GYNECOLOGY

## 2024-02-29 NOTE — PROGRESS NOTES
ASSESSMENT/PLAN  Postpartum BP check.  Labile BP's requiring readmission to hospital for BP control  Now doing well.  Pt to call me in one week to review BP's, sooner if symptoms.    SUBJECTIVE    HPI    34 yo s/p LTCS 35 2/7 weeks at [facility] [place], Preeclampsia with severe features on Mag, presents for BP check.     Discharged home from [facility] on nifedipine ER 60 mg po twice daily and labetalol 800 mg three times daily. Had a rash and stopped nifedipine. Readmitted [facility] for severe range BP's. Procardia restarted and pt sent home. Readmitted for headache. Developed severe range BP while admitted.  Her antihypertensive medication was titrated with resolution of symptoms and improvement in BP. Labile BP's during admission. After consult with M Dr. Downing, 3rd BP med added, hydrochlorothiazide 12.5 mg. In addition to hydrochlorothiazide, Discharged home on Nifedipine ER 60 mg po bid, labetalol 800 mg po tid.  Called me 2/27/2024 am and was dizzy with a headache. Decreased dose of labetalol to 600 mg and feeling much better.  No more headaches.  BP at home looks good. Mild range BP here.  Baby breast and supplementing. Baby also doing well and gaining weight.     OBJECTIVE    /90   Wt 92.1 kg (203 lb)   LMP 06/11/2023   Breastfeeding No Comment: PUMPING, SLOWLY LATCHING  BMI 34.83 kg/m²     Physical Exam     Constitutional: Alert and in no acute distress. Well developed, well nourished   Abdomen: soft nontender; no abdominal mass palpated, no organomegaly and no hernias   Incision: clean, dry intact.  Psychiatric: alert and oriented x 3., affect normal to patient baseline and mood: appropriate       Audra Monsivais MD

## 2024-03-01 ENCOUNTER — TELEPHONE (OUTPATIENT)
Dept: OBSTETRICS AND GYNECOLOGY | Facility: CLINIC | Age: 34
End: 2024-03-01
Payer: COMMERCIAL

## 2024-03-05 NOTE — TELEPHONE ENCOUNTER
Spoke with patient notifying her of what Dr Monsivias said - she can stop the medication at the end of this week if she has no swelling and blood pressures have been okay. If those 2 criteria have not been met, she is to continue the medication for another week.

## 2024-03-07 ENCOUNTER — TELEPHONE (OUTPATIENT)
Dept: OBSTETRICS AND GYNECOLOGY | Facility: CLINIC | Age: 34
End: 2024-03-07
Payer: COMMERCIAL

## 2024-03-07 NOTE — TELEPHONE ENCOUNTER
TC with pt.   BP's look good.  Plan to stop hydrochlorothiazide on Sunday.   Continue labetalol and nifedipine.   Call me one week with BP record or earlier if sx need.

## 2024-03-08 ENCOUNTER — APPOINTMENT (OUTPATIENT)
Dept: OBSTETRICS AND GYNECOLOGY | Facility: CLINIC | Age: 34
End: 2024-03-08
Payer: COMMERCIAL

## 2024-03-13 ENCOUNTER — TELEPHONE (OUTPATIENT)
Dept: OBSTETRICS AND GYNECOLOGY | Facility: CLINIC | Age: 34
End: 2024-03-13
Payer: COMMERCIAL

## 2024-03-14 NOTE — TELEPHONE ENCOUNTER
TC with pt.   Stopped her hydrochorothiazide for 3 days. Began retaining water again. Restarted last night and was up all night urinating.   BP's are normal range but increased off of hydrochlorothiazide.  Plan to refer pt to cardiology/OB HTN clinic with Dr. Garcia. Referral placed.   Pt has followup with me for 6 week visit in two weeks and will assess. Instructed to call office sooner if needed.

## 2024-03-15 NOTE — TELEPHONE ENCOUNTER
TC with pt.  Last hydrochlorothiazide yesterday am. Felt dehydrated all day yesterday.   Currently on Nifedipine ER 60 mg daily and took that this am.   On labetalol 400 mg bid and when she took it yesterday afternoon did not feel good.  BP's in low normal range.  Stop both hydrochlorothiazide and labetalol.   Call me in am before taking nifedipine dose to see how pts is feeling and report BP.  Pt only has nifedipine 60 mg. May need to call in an rx for 30 mg.

## 2024-03-16 RX ORDER — NIFEDIPINE 30 MG/1
30 TABLET, FILM COATED, EXTENDED RELEASE ORAL
Qty: 30 TABLET | Refills: 0 | Status: SHIPPED | OUTPATIENT
Start: 2024-03-16 | End: 2024-03-26 | Stop reason: ALTCHOICE

## 2024-03-16 NOTE — PROGRESS NOTES
TC with pt. /75 this am prior to any meds. Feels well.  Stop hydrochlorothiazide, stop labetalol.  Decrease dose of Procardia to 30 mg q day. Rx sent.

## 2024-03-19 ENCOUNTER — TELEPHONE (OUTPATIENT)
Dept: OBSTETRICS AND GYNECOLOGY | Facility: CLINIC | Age: 34
End: 2024-03-19
Payer: COMMERCIAL

## 2024-03-19 NOTE — TELEPHONE ENCOUNTER
TC with pt.  Yesterday took /102 in am. Took Procardia 60 mg dose after that. Repeat  /95, took additional procardia 30 mg. No HA, blurred vision. The previous day after we had discussion, had stopped procardia, all BP meds and was feeling well.    Already took Procardia XL '60 mg this am.   Recommend she continue Procardia XL 60 mg daily until she sees me in 2 weeks.

## 2024-03-25 NOTE — PROGRESS NOTES
"Pap: 1- NEG HPV NEG      ASSESSMENT/PLAN    Postpartum care and examination  Normal postpartum exam.  Post partum hypertension.   Continue procardia XL 60 mg 6 more days then decrease to Procardia XL 30 mg daily for one month. Previously pt had rebound in BP when stopped her meds. Pt to call in one month and we will plan to stop Procardia if BP well controlled.   Pt changing PCP. Discussed with pt that if needs to continue on BP medicine after one month, will transition her care to PCP.   Contraceptive option while lactating reviewed with pt. Gave information about options such as progesterone only pill and IUD.       SUBJECTIVE    HPI    34 yo s/p LTCS 35 2/7 weeks at [facility] [place], Preeclampsia with severe features on Mag, presents for postpartum exam.      Discharged home from [facility] [place] on nifedipine ER 60 mg po twice daily and labetalol 800 mg three times daily. Had a rash and stopped nifedipine. Readmitted [facility] for severe range BP's. Procardia restarted and pt sent home. Readmitted for headache. Developed severe range BP while admitted.  Her antihypertensive medication was titrated with resolution of symptoms and improvement in BP. Labile BP's during admission. After consult with M Dr. Downing, 3rd BP med added, hydrochlorothiazide 12.5 mg. In addition to hydrochlorothiazide, Discharged home on Nifedipine ER 60 mg po bid, labetalol 800 mg po tid.  Now taking just Procardia 60 mg /day. Pt feeling very well as of last weekend and feels like she \"turned a corner\".   Brought BP log with her. BP max 130s/80s.    Baby doing well. Pumping.        OBJECTIVE      Physical Exam     Constitutional: Alert and in no acute distress. Well developed, well nourished   Head and Face: Head and face: normal   Eyes: Normal external exam - nonicteric sclera, extraocular movements intact (EOMI) and no ptosis.   Ears, Nose, Mouth, and Throat: External inspection of ears and nose: normal   Neck: no neck asymmetry. Supple and thyroid not " enlarged and there were no palpable thyroid nodules   Cardiovascular: Heart rate and rhythm were normal, normal S1 and S2, no gallops, and no murmurs   Pulmonary: No respiratory distress and clear bilateral breath sounds   Chest: Breasts: normal appearance, Changes consistent with lactation. no skin changes and palpation of breasts and axillae: no palpable mass and no axillary lymphadenopathy   Abdomen: soft nontender; no abdominal mass palpated, no organomegaly and no hernias   Incision: healing well.  Genitourinary: external genitalia: normal, no inguinal lymphadenopathy, Bartholin's urethral and [place]'s glands: normal, urethra: normal, bladder: normal on palpation and perianal area: normal   Vagina: normal.   Cervix: Normal appearing without lesions.  Uterus: Normal, mobile, nontender.  Right Adnexa/parametria: Normal.   Left Adnexa/parametria: Normal.   Skin: normal skin color and pigmentation, normal skin turgor, and no rash.   Psychiatric: alert and oriented x 3., affect normal to patient baseline and mood: appropriate          Audra Monsivais MD

## 2024-03-26 ENCOUNTER — POSTPARTUM VISIT (OUTPATIENT)
Dept: OBSTETRICS AND GYNECOLOGY | Facility: CLINIC | Age: 34
End: 2024-03-26
Payer: COMMERCIAL

## 2024-03-26 VITALS — DIASTOLIC BLOOD PRESSURE: 82 MMHG | SYSTOLIC BLOOD PRESSURE: 130 MMHG | BODY MASS INDEX: 33.56 KG/M2 | WEIGHT: 195.6 LBS

## 2024-03-26 RX ORDER — NIFEDIPINE 60 MG/1
60 TABLET, FILM COATED, EXTENDED RELEASE ORAL
COMMUNITY

## 2024-03-26 ASSESSMENT — EDINBURGH POSTNATAL DEPRESSION SCALE (EPDS)
THINGS HAVE BEEN GETTING ON TOP OF ME: NO, I HAVE BEEN COPING AS WELL AS EVER
I HAVE BLAMED MYSELF UNNECESSARILY WHEN THINGS WENT WRONG: NO, NEVER
TOTAL SCORE: 0
I HAVE FELT SCARED OR PANICKY FOR NO GOOD REASON: NO, NOT AT ALL
I HAVE BEEN SO UNHAPPY THAT I HAVE BEEN CRYING: NO, NEVER
I HAVE FELT SAD OR MISERABLE: NO, NOT AT ALL
I HAVE BEEN ABLE TO LAUGH AND SEE THE FUNNY SIDE OF THINGS: AS MUCH AS I ALWAYS COULD
I HAVE LOOKED FORWARD WITH ENJOYMENT TO THINGS: AS MUCH AS I EVER DID
THE THOUGHT OF HARMING MYSELF HAS OCCURRED TO ME: NEVER
I HAVE BEEN ANXIOUS OR WORRIED FOR NO GOOD REASON: NO, NOT AT ALL
I HAVE BEEN SO UNHAPPY THAT I HAVE HAD DIFFICULTY SLEEPING: NOT AT ALL

## 2024-03-29 ENCOUNTER — APPOINTMENT (OUTPATIENT)
Dept: MATERNAL FETAL MEDICINE | Facility: CLINIC | Age: 34
End: 2024-03-29
Payer: COMMERCIAL

## (undated) DEVICE — SUTURE, VICRYL, 4-0, 27 IN, PS-1, UNDYED

## (undated) DEVICE — SUTURE, PDS II, 0 36 IN, CT-1, VIOLET

## (undated) DEVICE — DRAPE PACK, CESAREAN SECTION, CUSTOM, UHC

## (undated) DEVICE — SUTURE, VICRYL, 0, 36 IN, CT, UNDYED